# Patient Record
Sex: FEMALE | Race: WHITE | NOT HISPANIC OR LATINO | Employment: OTHER | URBAN - METROPOLITAN AREA
[De-identification: names, ages, dates, MRNs, and addresses within clinical notes are randomized per-mention and may not be internally consistent; named-entity substitution may affect disease eponyms.]

---

## 2021-01-27 ENCOUNTER — AMBULATORY - HEALTHEAST (OUTPATIENT)
Dept: NURSING | Facility: CLINIC | Age: 76
End: 2021-01-27

## 2021-02-17 ENCOUNTER — AMBULATORY - HEALTHEAST (OUTPATIENT)
Dept: NURSING | Facility: CLINIC | Age: 76
End: 2021-02-17

## 2021-06-16 PROBLEM — I48.91 ATRIAL FIBRILLATION WITH RVR (H): Status: ACTIVE | Noted: 2018-05-07

## 2021-06-16 PROBLEM — S36.039D LACERATION OF SPLEEN, SUBSEQUENT ENCOUNTER: Status: ACTIVE | Noted: 2018-05-03

## 2021-06-16 PROBLEM — J96.01 ACUTE RESPIRATORY FAILURE WITH HYPOXIA (H): Status: ACTIVE | Noted: 2018-05-07

## 2021-08-15 ENCOUNTER — HEALTH MAINTENANCE LETTER (OUTPATIENT)
Age: 76
End: 2021-08-15

## 2021-08-23 ENCOUNTER — OFFICE VISIT (OUTPATIENT)
Dept: NEUROLOGY | Facility: CLINIC | Age: 76
End: 2021-08-23
Payer: COMMERCIAL

## 2021-08-23 ENCOUNTER — LAB (OUTPATIENT)
Dept: LAB | Facility: CLINIC | Age: 76
End: 2021-08-23
Payer: COMMERCIAL

## 2021-08-23 VITALS — HEART RATE: 73 BPM | DIASTOLIC BLOOD PRESSURE: 87 MMHG | SYSTOLIC BLOOD PRESSURE: 182 MMHG

## 2021-08-23 DIAGNOSIS — G31.84 MILD COGNITIVE IMPAIRMENT WITH MEMORY LOSS: Primary | ICD-10-CM

## 2021-08-23 DIAGNOSIS — G31.84 MILD COGNITIVE IMPAIRMENT WITH MEMORY LOSS: ICD-10-CM

## 2021-08-23 LAB — VIT B12 SERPL-MCNC: 943 PG/ML (ref 213–816)

## 2021-08-23 PROCEDURE — 84425 ASSAY OF VITAMIN B-1: CPT

## 2021-08-23 PROCEDURE — 82607 VITAMIN B-12: CPT

## 2021-08-23 PROCEDURE — 99205 OFFICE O/P NEW HI 60 MIN: CPT | Performed by: PSYCHIATRY & NEUROLOGY

## 2021-08-23 PROCEDURE — 99417 PROLNG OP E/M EACH 15 MIN: CPT | Performed by: PSYCHIATRY & NEUROLOGY

## 2021-08-23 PROCEDURE — 36415 COLL VENOUS BLD VENIPUNCTURE: CPT

## 2021-08-23 RX ORDER — DONEPEZIL HYDROCHLORIDE 5 MG/1
TABLET, FILM COATED ORAL
Qty: 390 TABLET | Refills: 0 | Status: SHIPPED | OUTPATIENT
Start: 2021-08-23 | End: 2021-11-29

## 2021-08-23 NOTE — PATIENT INSTRUCTIONS
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  This disorder can come from multiple things, and it is unclear from your history and this visit alone what the cause is.  Certainly conditions like stroke, injury to the brain from lack of oxygen, vitamin/mineral deficits need to be ruled out as etiologies, considering these can either be corrected or do not progress.  - MRI brain w/wout contrast  - OT for CPT  - Neuropsychological testing  - B12, B1    One possible etiology for MCI amnestic type is dementia, specifically Alzheimer's.  To help slow progression for possible progression from MCI into Alzheimer's dementia, you would benefit from starting a medication called donepezil.  - Donepezil 5 mg at bedtime for 30 days, then   - Donepezil 10 mg at bedtime

## 2021-08-23 NOTE — LETTER
8/23/2021         RE: Terrie Dia  [address] [place] [address]        Dear Colleague,    Thank you for referring your patient, Terrie Dia, to the [facility]. Please see a copy of my visit note below.    [facility] Neurology Consultation    Terrie Dia MRN# [id number]   Age: 76 year old YOB: 1945     Requesting physician: Concepción Iraheta     Reason for Consultation: memory concerns      History of Presenting Symptoms:   Terrie Dia is a 76 year old female who presents today for evaluation of memory concerns.      The patient is here with her family ( and daughter).    She goes to bed around 1030 and wakes up around 6-7am.  She wakes without an alarm.  She walks the dog, makes her breakfast.  She dresses herself, manages her daily cares.  She manages her own medications.  She enjoys working the yard, cleans the house, goes to grocery store and buys her own groceries (drives there).  There is no report of getting lost. She doesn't feel she has lost any functionality or abilities.  She still pays the bills, and manages her checkbook.  She puts the taxes together but takes it to a .  She has not gotten scammed by e-mail or telephone.  She has had no visual hallucinations. She doesn't act out her dreams.  She doesn't have any issues with being socially inappropriate.  There are no guns in the house.    The patient's  indicates that recently (in the last year) his wife doesn't sleep well. She goes to bed and wakes up and moves to the sofa.  His wife might be more upset, easier to aggression than previous.  In the last few months, th patient has repeated herself frequently (says something then repeats it a few minutes later). He describes an event of her describing a thing their dog did, then a few minutes later saying the same thing again.  He has noted that when his wife is driving and is presented with detours from  construction, this can cause a great deal of difficulty for her.      The patient's daughter (1 of 4) is here today.  She describes that their grandmother had dementia.  The children have noted that their mother is repeating herself sometimes, and is having periods of confusion.  The patient has called her daughter and had a long conversation, then may call again a few minutes later and have the same conversation.  This type of repetition has been noted by the patient's children, and brothers/sisters.  Grandchildren are still allowed to ride with the patient.      Past Medical History:   Afib with RVR  2018 admission - Subcapsular splenic hematoma w/extravasation and shock requiring 6 units RBC, 2 units plts, and IR angiography with emobolization and ICU admission.     Medications:     Current Outpatient Medications   Medication Sig     levothyroxine (SYNTHROID, LEVOTHROID) 112 MCG tablet [LEVOTHYROXINE (SYNTHROID, LEVOTHROID) 112 MCG TABLET] Take 112 mcg by mouth Daily at 6:00 am.      metoprolol tartrate (LOPRESSOR) 25 MG tablet [METOPROLOL TARTRATE (LOPRESSOR) 25 MG TABLET] Take 1 tablet (25 mg total) by mouth 2 (two) times a day.      Physical Exam:   Vitals: BP (!) 182/87 (BP Location: Right arm, Patient Position: Sitting, Cuff Size: Adult Regular)   Pulse 73    General: Seated comfortably in no acute distress.  HEENT: Neck with normal range of motion.    Skin: No rashes  Neurologic:     Mental Status: Fully alert, attentive and oriented. Speech clear and fluent, no paraphasic errors.   MoCA: 19/30  Visualspatial: 3/5 - errors in copying cube, and hand placement on clock was wrong.  Naming: 3/3  Memory: 0/5 - Recalls Red, Am[employer], Face with category clue.  Recalls velvet with multiple choice. Doesn't recall benigno.  Attention: 1/2 - states digit list in same order instead of reverse order. 1/1 with letter list disinhibition.  1/3 with serial 7 (gave up after on subtraction).  Language: 2/2 with repetition. 1/1  with fluency (13 words, no repeats)  Abstraction: 2/2  Orientation: 5/6 (forgot date)     Cranial Nerves: Visual fields intact to threat. PERRL. EOMI with normal smooth pursuit. Hearing not formally tested but intact to conversation.     Motor: No tremors or other abnormal movements observed.      Gait: Normal, steady casual gait.        Data: Pertinent prior to visit   Imaging: None of brain    Laboratory:  TSH, BMP, CBC normal on 4/9/2019, and 5/9/2018 respectively.         Assessment and Plan:   Assessment:  Mild cognitive impairment amnestic type    The patient has a clinical history (repeating one-self, asking questions over and again) which has been worsening for a year or two in the setting of prior hospitalization in 2018 for shock related to blood loss.  It is unclear if all the patient's symptoms came on after this prior hospitalization, but there does seem to be progression in symptoms since.  Further imaging with MRI should aide in defining any injury that was present in 2018, or any patterns of atrophy that have come about since.  The patient has some minor insight into her deficits, and I do think she would benefit from both neuropsychological testing and from CPT through OT.  Given her MCI amnestic type diagnosis, we did discuss the use of donepezil and side-effects associated.  The patient and her family were receptive to the idea that other injury to the brain needed to be investigated with these tests, as well as repeated testing for cognitive impairment was needed before a diagnosis for a specific type of dementia could be given.     Plan:  B1, B12  MRI brain w/wout contrast  OT for CPT  Neuropsychological testing  Donepezil 5 mg at bedtime for 30 days, then 10 mg QHS    Follow up in Neurology clinic in 5-6 months (after neuropsychological testing, video) or should new concerns arise.    PATRICK Samuel D.O.   of Neurology    Total time  today (108 min) in this patient  encounter was spent on pre-charting, counseling and/or coordination of care. We reviewed diagnostic results, impressions, and discussed other possible tests if symptoms do not improve. We discussed the implications of the diagnosis, as well as risks and benefits of management options. We reviewed treatment instructions and our scheduled follow-up as specified in the discharge plan. We also discussed the importance of compliance with the chosen course of treatment. The patient is in agreement with this plan and has no further questions.        Again, thank you for allowing me to participate in the care of your patient.        Sincerely,        Cedric Samuel, DO

## 2021-08-23 NOTE — PROGRESS NOTES
[facility] Neurology Consultation    Terrie Dia MRN# [id number]   Age: 76 year old YOB: 1945     Requesting physician: Concepción Iraheta     Reason for Consultation: memory concerns      History of Presenting Symptoms:   Terrie Dia is a 76 year old female who presents today for evaluation of memory concerns.      The patient is here with her family ( and daughter).    She goes to bed around 1030 and wakes up around 6-7am.  She wakes without an alarm.  She walks the dog, makes her breakfast.  She dresses herself, manages her daily cares.  She manages her own medications.  She enjoys working the yard, cleans the house, goes to grocery store and buys her own groceries (drives there).  There is no report of getting lost. She doesn't feel she has lost any functionality or abilities.  She still pays the bills, and manages her checkbook.  She puts the taxes together but takes it to a .  She has not gotten scammed by e-mail or telephone.  She has had no visual hallucinations. She doesn't act out her dreams.  She doesn't have any issues with being socially inappropriate.  There are no guns in the house.    The patient's  indicates that recently (in the last year) his wife doesn't sleep well. She goes to bed and wakes up and moves to the sofa.  His wife might be more upset, easier to aggression than previous.  In the last few months, th patient has repeated herself frequently (says something then repeats it a few minutes later). He describes an event of her describing a thing their dog did, then a few minutes later saying the same thing again.  He has noted that when his wife is driving and is presented with detours from construction, this can cause a great deal of difficulty for her.      The patient's daughter (1 of 4) is here today.  She describes that their grandmother had dementia.  The children have noted that their mother is repeating herself sometimes, and is having periods of  confusion.  The patient has called her daughter and had a long conversation, then may call again a few minutes later and have the same conversation.  This type of repetition has been noted by the patient's children, and brothers/sisters.  Grandchildren are still allowed to ride with the patient.      Past Medical History:   Afib with RVR  2018 admission - Subcapsular splenic hematoma w/extravasation and shock requiring 6 units RBC, 2 units plts, and IR angiography with emobolization and ICU admission.     Medications:     Current Outpatient Medications   Medication Sig     levothyroxine (SYNTHROID, LEVOTHROID) 112 MCG tablet [LEVOTHYROXINE (SYNTHROID, LEVOTHROID) 112 MCG TABLET] Take 112 mcg by mouth Daily at 6:00 am.      metoprolol tartrate (LOPRESSOR) 25 MG tablet [METOPROLOL TARTRATE (LOPRESSOR) 25 MG TABLET] Take 1 tablet (25 mg total) by mouth 2 (two) times a day.      Physical Exam:   Vitals: BP (!) 182/87 (BP Location: Right arm, Patient Position: Sitting, Cuff Size: Adult Regular)   Pulse 73    General: Seated comfortably in no acute distress.  HEENT: Neck with normal range of motion.    Skin: No rashes  Neurologic:     Mental Status: Fully alert, attentive and oriented. Speech clear and fluent, no paraphasic errors.   MoCA: 19/30  Visualspatial: 3/5 - errors in copying cube, and hand placement on clock was wrong.  Naming: 3/3  Memory: 0/5 - Recalls Red, Mormonism, Face with category clue.  Recalls velvet with multiple choice. Doesn't recall benigno.  Attention: 1/2 - states digit list in same order instead of reverse order. 1/1 with letter list disinhibition.  1/3 with serial 7 (gave up after on subtraction).  Language: 2/2 with repetition. 1/1 with fluency (13 words, no repeats)  Abstraction: 2/2  Orientation: 5/6 (forgot date)     Cranial Nerves: Visual fields intact to threat. PERRL. EOMI with normal smooth pursuit. Hearing not formally tested but intact to conversation.     Motor: No tremors or other  abnormal movements observed.      Gait: Normal, steady casual gait.        Data: Pertinent prior to visit   Imaging: None of brain    Laboratory:  TSH, BMP, CBC normal on 4/9/2019, and 5/9/2018 respectively.         Assessment and Plan:   Assessment:  Mild cognitive impairment amnestic type    The patient has a clinical history (repeating one-self, asking questions over and again) which has been worsening for a year or two in the setting of prior hospitalization in 2018 for shock related to blood loss.  It is unclear if all the patient's symptoms came on after this prior hospitalization, but there does seem to be progression in symptoms since.  Further imaging with MRI should aide in defining any injury that was present in 2018, or any patterns of atrophy that have come about since.  The patient has some minor insight into her deficits, and I do think she would benefit from both neuropsychological testing and from CPT through OT.  Given her MCI amnestic type diagnosis, we did discuss the use of donepezil and side-effects associated.  The patient and her family were receptive to the idea that other injury to the brain needed to be investigated with these tests, as well as repeated testing for cognitive impairment was needed before a diagnosis for a specific type of dementia could be given.     Plan:  B1, B12  MRI brain w/wout contrast  OT for CPT  Neuropsychological testing  Donepezil 5 mg at bedtime for 30 days, then 10 mg QHS    Follow up in Neurology clinic in 5-6 months (after neuropsychological testing, video) or should new concerns arise.    PATRICK Samuel D.O.   of Neurology    Total time  today (108 min) in this patient encounter was spent on pre-charting, counseling and/or coordination of care. We reviewed diagnostic results, impressions, and discussed other possible tests if symptoms do not improve. We discussed the implications of the diagnosis, as well as risks and benefits of  management options. We reviewed treatment instructions and our scheduled follow-up as specified in the discharge plan. We also discussed the importance of compliance with the chosen course of treatment. The patient is in agreement with this plan and has no further questions.

## 2021-08-26 ENCOUNTER — HOSPITAL ENCOUNTER (OUTPATIENT)
Dept: MRI IMAGING | Facility: CLINIC | Age: 76
Discharge: HOME | End: 2021-08-26
Attending: PSYCHIATRY & NEUROLOGY | Admitting: PSYCHIATRY & NEUROLOGY
Payer: COMMERCIAL

## 2021-08-26 DIAGNOSIS — G31.84 MILD COGNITIVE IMPAIRMENT WITH MEMORY LOSS: ICD-10-CM

## 2021-08-26 PROCEDURE — 70551 MRI BRAIN STEM W/O DYE: CPT

## 2021-08-27 LAB — VIT B1 PYROPHOSHATE BLD-SCNC: 188 NMOL/L

## 2021-09-24 ENCOUNTER — HOSPITAL ENCOUNTER (OUTPATIENT)
Dept: OCCUPATIONAL THERAPY | Facility: CLINIC | Age: 76
End: 2021-09-24
Attending: PSYCHIATRY & NEUROLOGY
Payer: COMMERCIAL

## 2021-09-24 DIAGNOSIS — G31.84 MILD COGNITIVE IMPAIRMENT WITH MEMORY LOSS: ICD-10-CM

## 2021-09-24 PROCEDURE — 96125 COGNITIVE TEST BY HC PRO: CPT | Mod: GO | Performed by: OCCUPATIONAL THERAPIST

## 2021-09-24 PROCEDURE — 97535 SELF CARE MNGMENT TRAINING: CPT | Mod: GO | Performed by: OCCUPATIONAL THERAPIST

## 2021-09-24 NOTE — PROGRESS NOTES
[facility]    OUTPATIENT OCCUPATIONAL THERAPY  EVALUATION  PLAN OF TREATMENT FOR OUTPATIENT REHABILITATION  (COMPLETE FOR INITIAL CLAIMS ONLY)  Patient's Last Name, First Name, M.I.  YOB: 1945  Terrie Dia                        Provider's Name  [facility] Medical Record No.  [id number]                               Onset Date:     08/23/21   Start of Care Date:     09/24/21   Type:     ___PT   _X_OT   ___SLP Medical Diagnosis:     Mild cognitive impairment with memory loss                          OT Diagnosis:     Impaired memory Visits from SOC:  1   _________________________________________________________________________________  Plan of Treatment/Functional Goals:  Self care/Home management     See Daily Documentation in Flowsheets for details.      Goals  Goal Identifier: CPT  Goal Description: Pt will complete the Cognitive Performance Test and patient and family will verbalize understanding of the results and recommendations from the assessment to determine safety in home environment and with I/ADLs.  Target Date: 09/24/21  Date Met: 09/24/21    Goal Identifier: Memory  Goal Description: Patient will verbalize understanding of memory compensatory strategies and ways to stay cognitively fit to improve memory and independence for remembering appointments, conversations, etc.  Target Date: 09/24/21  Date Met: 09/24/21       Therapy Frequency: Eval only and One time Treat  Certification date from: 09/24/21, Certification date to: 09/24/21  Predicted Duration of Therapy Intervention (days/wks): One time treatment only    Thank you for referring Terrie to occupational therapy!   Laura Pizarro, OTR/L         I CERTIFY THE NEED FOR THESE SERVICES FURNISHED UNDER        THIS PLAN OF TREATMENT AND WHILE UNDER MY CARE     (Physician co-signature of this  document indicates review and certification of the therapy plan).                         Referring Physician: Cedric Samuel, DO     Initial Assessment        See Epic Evaluation      Start Of Care Date: 09/24/21

## 2021-09-24 NOTE — PROGRESS NOTES
Occupational Profile (including diagnosis and medical history): Terrie Dia is a 76-year-old female who presents today for occupational therapy evaluation secondary to mild cognitive impairment with memory loss to complete the cognitive performance test per MD orders. Pt attends evaluation alone. Per chart review, pt is reporting that she does not get lost often and doesn t feel as though she has lost any functional abilities or that she experiences any difficulties with her memory. Per report, the pt s mother was diagnosed with dementia. Per chart review, patient has called her daughter and had a long conversation and then will call again a few minutes later to have the same conversation. Per neurology note,  indicates that recently (in the last year) his wife doesn't sleep well and that in the last few months, the patient has repeated herself frequently. During the neurology appointment, her  described an event of her describing a thing their dog did, then a few minutes later saying the same thing again. He reported that when his wife is driving and is presented with detours from construction, this can cause a great deal of difficulty for her per neurology note.     The [place] Cognitive Assessment (MoCA) was designed as a rapid screening instrument for mild cognitive dysfunction. It assesses different cognitive domains: attention and concentration, executive functions, memory, language, visuoconstructional skills, conceptual thinking, calculations, and orientation. Completed the alternative version (Version 7.2) today (9/24/21) due to completing recent MoCA - Version 8.2. The total possible score is 30 points; a score of 26 or above is considered within normal range.   Patient scored 21/30.   Visualspatial: 4/5 - errors in copying cube.  Naming: 3/3  Memory: 0/5 - Recalls Truck, Green, Violin with category cues. Recalls Banana and Desk provided multiple choice cues.  Attention: 2/2 - states digit  list appropriately. 1/1 with letter list disinhibition. 2/3 with serial 7 (gave up after on subtraction).  Language: 2/2 with repetition. 1/1 with fluency (13 words, two repeats - saw and sharpen)  Abstraction: 2/2  Orientation: 4/6 - forgot date and day of the week     Previous cognitive screens completed in OT or by Physician and score: MoCA (Version 8.2) was completed by neurologist on 8/23/21.   Score: 19/30   Visualspatial: 3/5 - errors in copying cube, and hand placement on clock was wrong.  Naming: 3/3  Memory: 0/5 - Recalls Red, Roman [employer], Face with category clue. Recalls velvet with multiple choice. Doesn't recall benigno.  Attention: 1/2 - states digit list in same order instead of reverse order. 1/1 with letter list disinhibition. 1/3 with serial 7 (gave up after on subtraction).  Language: 2/2 with repetition. 1/1 with fluency (13 words, no repeats)  Abstraction: 2/2  Orientation: 5/6 (forgot date)    Functional History Interview:  Informants: Patient    Client s perception of memory/ thinking or functional difficulties: Patient reporting that she thinks that she might have started having more difficulty with memory following hospital stay due to perforated bowel. Patient reporting that she does not believe she has any difficulties with memory such as misplacing items, gets lost while driving, missing appointments, or missing paying bills. Reports making lists for all tasks such grocery, to-do lists during the day, weekly check ins - uses a calendar.      Living situation: Lives at home in a house with her , Rene Pires).      Home maintenance activities: Patient reporting that she mows the lawn, gardening, and cleans the house herself without concern.      Meal preparation and grocery shopping: Prepares 2-3 meals per day and reports using the stovetop (daily), oven (twice a week), and microwave (daily). Goes to the grocery and buys her own groceries.      Finances and paying bills: Patient pays all  bills currently and pulls tax information together with a  assisting with filing the taxes. Does not have automatic bill payments at this time. Reports that she does not want to automatically pay any bills. Manages her own check book and reports that she isn't as picky with balancing it as she has made no recent errors. Writes all bills and payments due per month in file cabinet that she routinely checks.      Medication management: Reports that she takes one medication and vitamins using weekly pill box for medication management.      Driving and transportation: Patient reports driving to all community outings and to run errands.  drives when they are going somewhere together typically.      Leisure and routine activities: Patient reports that enjoys tending to her garden, attending occasional lunch group (seasonal - quarterly), and Zoroastrian activities (cleaning, attending Zoroastrian).      ADL/Mobility/Physical Functioning: Reports no concerns with ADLs such as dressing, grooming, bathing. Manages all ADLs without AE required.     Fall Risk Screen:   Has the patient fallen 2 or more times in the last year? No      Has the patient fallen and had an injury in the past year? No    Is the patient a fall risk? No    Cognitive Performance Test    SUMMARY OF TEST:  The Cognitive Performance Test (CPT) is a standardized performance-based assessment to measure working memory/executive function processing capacities that underlie functional performance. Subtasks include common basic and instrumental activities of daily living (ADL/IADL) which are rated based on the manner in which patients respond to task demands of varying complexity. The total CPT score describes a level of functioning that indicates how information is processed, implications for functional activities, potential safety risks and a recommended level of supervision or assist based on cognitive function. The highest total score on this test  is in the range of 5.6 to 5.8.    DATE OF TESTIN21    RESULTS OF TESTING:                                                                                         CPT Subtest Results    MEDBOX: 5.5/6 SHOP/GLOVES:  PHONE: 4.56   WASH:   TRAVEL: N/A TOAST:    DRESS:    TOTAL CPT SCORE:  31/33     Average CPT Score  5.1/5.6    INTERPRETATION OF TEST RESULTS:    Based on the Cognitive Performance Test, this patient scored at CPT Level 5.0.  See CPT Levels reference below.    CPT Levels Reference:    Patient's Average CPT Score:  5.0                                                                                                                                                  Individual scores range along a continuum as outlined below.  In addition to cognitive status, other factors may affect safety in a home environment.  Please refer to specific recommendations for this patient.    ___5.6-5.8  Normal functioning (absence of cognitive-functional disability).  Independent in managing personal affairs, monitors and directs own behavior.  Uses complex information to carry out daily activities with safety and accuracy.    Proficient with instrumental activities of daily living (IADL) and learning new activity.  Problems are anticipated, errors are avoided, and consequences of actions are considered.      ___5.0   Mild cognitive-functional disability; deficits in working memory and executive thought processes. Difficulty using complex information. Problems may be observed with recent memory, judgment, reasoning and planning ahead. May be impulsive or have difficulty anticipating consequences.  Safety:  May require assistance to plan ahead; or to manage complex medication schedules, appointments or finances.  Hazardous activities may need to be monitored or limited.  ADL:  Mild functional decline.  Able to complete basic self-care and routine household tasks.  May have difficulty with complex daily tasks  "such as reading, writing, meal preparation, shopping or driving.   Learns through hands on teaching. Self-centered behavior or difficulty considering the needs of others may be seen related to trouble seeing the  whole picture\". Can appear disorganized or uninhibited.    ___4.5  Mild to moderate cognitive-functional disability. Significant deficits in working memory and executive thought processes. Judgment, reasoning and planning show obvious impairment.  Distractible with inability to shift attention/actions given competing stimuli.  Difficulty with problem solving and managing details. Complex daily tasks performed with inconsistency, difficulty, or error.     Safety:  Medications should be monitored, stove use may require supervision, and driving ability may be affected.  Impaired safety awareness with inability to anticipate potential problems.  May not recognize or respond to emergent situations. Requires frequent check-in support.   ADL:  Mild difficulty with simple everyday self-care tasks. Benefits from structured, routine activity.  Will likely need reminders to complete tasks outside of the routine. Requires assistance with planning and IADL tasks like shopping and finances. Learns concrete tasks through repetition, but performance may not generalize. Tends to be impulsive with poor insight. Self centered behavior or inability to consider the needs of others is common.    ___4.0  Moderate cognitive-functional disability; abstract to concrete thought processes. Working memory and executive function impairments are obvious. Difficulty with planning and problem solving.  Behavior is goal-directed, but unable to follow multi-step directions, is easily distracted, and may not recognize mistakes.  Inability to anticipate hazards or understand precautions.  Safety:  Recommend 24-hour supervision for safety. Supervision needed for medication management and for hazardous activities. May not be able to follow a " restricted diet. Can get lost in unfamiliar surroundings. Generally, persons functioning at level 4 should not be driving.   ADL:  Some decline in quality or frequency of ADL.  [place] enhanced by use of a routine, simple concrete directions, and caregiver set-up of needed items. Complex tasks such as money or home management typically requires assistance.  Relies heavily on vision to guide behavior; will ignore objects/hazards not in plain sight and can be distracted by irrelevant objects. Often has poor insight.  Able to carry out social conversation and may verbally  cover  for deficits leading caregivers to believe they are capable of functioning independently.       ___3.5  Moderate cognitive-functional disability; increased cues needed for task completion. Aware of concrete task steps but needs prompting or cues to initiate and complete simple tasks. Attention span is limited, simple directions may need to be repeated, and re-focus to a topic or task may be required.  Safety:  24-hour supervision required for safety and for assistance with daily tasks. Assistance required with medications, and access to medication should be limited. Meals, nutrition and dietary restrictions need to be monitored.  All hazardous activities should be restricted or supervised. Should not drive. Prone to wandering and can become lost.  ADL:  Moderate functional decline. Familiar tasks usually requires set-up of supplies and directions to complete steps. May need objects handed to them for task initiation. Function best with a set schedule in familiar surroundings with familiar people. All complex tasks must be done by others. Vocabulary is diminished and speech often unfocused.     Summary of functional cognitive status: During medication subtask, patient requiring general cueing for as needed medication and complex medication and able to self correct with cueing. During phone subtask, patient forgot which question to ask even  though she wrote the information down immediately with directions provided. Also demonstrated needing general cueing to locate paint retail store instead of contractors. Although patient reporting that she would have a contractor paint their house and she doesn't feel up to completing that large of a task aymore. Completed all other sub tasks without concern or deficits noted. Patient cooperative throughout all assessment tasks.     Factors affecting performance:  No additional problems noted    Recommendations:    Supervision in living setting:  Weekly checks                                                       TIME ADMINISTERING TEST: 35    TIME FOR INTERPRETATION AND PREPARATION OF REPORT: 25    TOTAL TIME: 60

## 2021-09-24 NOTE — PROGRESS NOTES
09/24/21 0800   Quick Adds   Quick Adds Certification       Present No   General Information   Start Of Care Date 09/24/21   Referring Physician Cedric Samuel, DO   Orders Other   Other Orders Cognitive Performance Testing  (Neuropsych referral was placed.)   Orders Date 08/23/21   Medical Diagnosis Mild cognitive impairment with memory loss   Onset of Illness/Injury or Date of Surgery 08/23/21   Precautions/Limitations No known precautions/limitations   Surgical/Medical History Reviewed Yes   Additional Occupational Profile Info/Pertinent History of Current Problem Terrie Dia is a 76-year-old female who presents today for occupational therapy evaluation secondary to mild cognitive impairment with memory loss to complete the cognitive performance test per MD orders. Pt attends evaluation with her . Per chart review, pt is reporting that she does not get lost often and doesn't feel as though she has lost any functional abilities or that she experiences any difficulties with her memory. Per report, the pt's mother was diagnosed with dementia.    Cognitive Status Examination   Orientation Orientation to person, place and time   Level of Consciousness Alert   Follows Commands and Answers Questions 100% of the time   Personal Safety and Judgment Intact   Memory Impaired   Attention No deficits were identified   Organization/Problem Solving No deficits were identified   Cognitive Comment See CPT note for details.    Visual Perception   Visual Perception No deficits were identified   Range of Motion (ROM)   ROM Quick Adds No deficits identified   Strength   Strength No deficits were identified   Functional Mobility   Ambulation IND   Bathing   Level of [place] - Bathing independent   Upper Body Dressing   Level of [place]: Dress Upper Body independent   Lower Body Dressing   Level of [place]: Dress Lower Body independent   Toileting   Level of [place]:  Toilet independent   Grooming   Level of [place]: Grooming independent   Eating/Self-Feeding   Level of [place]: Eating independent   Activity Tolerance   Activity Tolerance WNL   Planned Therapy Interventions   Planned Therapy Interventions Self care/Home management   Intervention Comments See Daily Documentation in Flowsheets for details.    Adult OT Eval Goals   OT Eval Goals (Adult) 1;2    OT Goal 1   Goal Identifier CPT   Goal Description Pt will complete the Cognitive Performance Test and patient and family will verbalize understanding of the results and recommendations from the assessment to determine safety in home environment and with I/ADLs.   Target Date 09/24/21   Date Met 09/24/21    OT Goal 2   Goal Identifier Memory   Goal Description Patient will verbalize understanding of memory compensatory strategies and ways to stay cognitively fit to improve memory and independence for remembering appointments, conversations, etc.   Target Date 09/24/21   Date Met 09/24/21   Clinical Impression   Criteria for Skilled Therapeutic Interventions Met Evaluation only   OT Diagnosis Impaired memory   Influenced by the following impairments mild cognitive impairment with memory loss    Assessment of Occupational Performance 1-3 Performance Deficits   Identified Performance Deficits Driving   Clinical Decision Making (Complexity) Low complexity   Therapy Frequency Eval only   Predicted Duration of Therapy Intervention (days/wks) One time treatment only   Risks and Benefits of Treatment have been explained. Yes   Patient, Family & other staff in agreement with plan of care Yes   Clinical Impression Comments Recommend one time treat only for education following cognitive performance test and initiation of cognitive HEP with exercises provided in session.   Education Assessment   Barriers To Learning Cognitive   Preferred Learning Style Reading;Demonstration   Therapy Certification   Certification date from  09/24/21   Certification date to 09/24/21

## 2021-10-26 ENCOUNTER — OFFICE VISIT (OUTPATIENT)
Dept: NEUROLOGY | Facility: CLINIC | Age: 76
End: 2021-10-26
Payer: COMMERCIAL

## 2021-10-26 DIAGNOSIS — F06.70 MILD NEUROCOGNITIVE DISORDER DUE TO ALZHEIMER'S DISEASE (H): Primary | ICD-10-CM

## 2021-10-26 DIAGNOSIS — G30.9 MILD NEUROCOGNITIVE DISORDER DUE TO ALZHEIMER'S DISEASE (H): Primary | ICD-10-CM

## 2021-10-26 NOTE — LETTER
10/26/2021         RE: Terrie Dia  [address]  [place] [address]        Dear Colleague,    Thank you for referring your patient, Terrie Dia, to the [facility]. Please see a copy of my visit note below.    NEUROPSYCHOLOGY EVALUATION  [facility]      NAME: Terrie Dia    YOB: 1945   AGE: 76 year old  EDU: 15  DATE OF EVALUATION: 10/26/2021    REASON FOR REFERRAL:  Ms. Dia is a 76 year old, right-handed,  female with hypothyroidism, atrial fibrillation with RVR, history of splenic laceration that resulted in traumatic hemorrhagic shock and severe blood loss, and childhood history of polio. Due to her family's concerns about progressive memory loss, she was referred for this neuropsychological evaluation by neurologist, Cedric Samuel DO, in order to assist with differential diagnosis and care planning.     SUMMARY OF FINDINGS: (please refer to Extended Report below for full details and comprehensive clinical history)  Due to the ongoing COVID-19 pandemic, this assessment was conducted using PPE worn by the examiner and a face-mask for the patient. The standard administration of these tests involves direct face-to-face methods. The full impact of applying non-standard administration methods with PPE is not fully appreciated at this time. As such, the diagnostic conclusions and recommendations for treatment provided in this report are being advanced with caution.    With these limitations in mind, results of testing indicate that Ms. Dia is of estimated average premorbid intellectual functioning, and most of Ms. Dia's performances are generally commensurate with that estimate. However, she exhibits impairment on tests of learning and memory, along with a relative weakness in semantic fluency. Of note, her semantic fluency is significantly lower than her phonemic fluency, with scores falling in the lower  limit of the low average range and the above average range, respectively. 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     With regard to learning/memory performances more specifically, Ms. Dia's verbal learning efficiency is mildly impaired, and her delayed free recall of that same information is exceptionally low (retention rates ranging from 6% to 20% after a 20-minute delay). Her learning of nonverbal (visual) information is intact and stronger than her verbal learning; however, she does not recall any information after 20 minutes on the nonverbal memory task. In general, her recall benefits slightly from prompts/cues on recognition testing on all memory tasks, but not as much as I would expect. This pattern of performance on memory testing appears most consistent with a mild encoding deficit.    All other cognitive domains are within normal limits, including attention/concentration, processing speed, visuospatial/constructional ability, all other language processing abilities (including comprehension, confrontation naming, phonemic fluency, and word reading), and all other executive functions (including abstract reasoning and mental flexibility/set-shifting). Aside from her low average semantic fluency, her verbal abilities appear to remain a relative strength of hers. Ms. Dia also denies any significant emotional distress at this time, both in the clinical interview and on self-report measures.    IMPRESSIONS:    Overall, these results are mostly suggestive of mild cerebral dysfunction in the bilateral hippocampal structures, along with more subtle compromise of the dominant lateral temporal region. Some very subtle involvement of the prefrontal structures cannot be fully ruled  out.    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 Along with her cognitive profile that reveals a mild encoding deficit and a significant discrepancy between semantic and verbal fluency (in favor of the latter), her memory loss began insidiously and has continued to progress over the past year. She also has a family history of dementia in her mother, who developed symptoms in her early 70's.     It is unlikely that the hemorrhagic shock and acute respiratory failure with hypoxia that occurred in 2018 is a primary causal factor at this point, particularly given that the patient's cognitive issues were not noticed until about a year ago. She also scored 5/5 on the Mini-Cog in 2019 (after those significant medical events), and most recently she scored 3/5 in May of this year (although this is a very crude cognitive screening measure). That said, those medical events may have made her brain more vulnerable to developing a neurodegenerative condition.    DIAGNOSIS:  Mild Neurocognitive Disorder, possibly due to Alzheimer's disease    RECOMMENDATIONS:  1) Ongoing neurologic care and monitoring is recommended.     2) Despite Ms. Dia's memory difficulties, she has been able to remain fully independent in all of her daily activities. This may be in part due to maintaining a routine and living in the same home/neighborhood for 50 years. She is encouraged to continue doing so; however, I would recommend occasional oversight with the more complex/important tasks. For example, she should continue managing her medications and filling her own pillbox, but someone could double-check he work for accuracy once a month.     3) If not already done, completion of paperwork for advance directives and assignment of  healthcare and financial Power of  should be considered at this time.    4) The Alzheimer s Association (http://www.alz.org/mnnd or 1-960.853.4478) has information about local support groups, respite services, other community resources, as well as a breadth of informational materials. Although she is not currently endorsing any significant emotional distress, if more individualized support is desired, the patient and/or her loved ones may benefit from establishing care with our psychologist, Adri Keenan PsyD, LP (at [facility] - [phone]). Dr. Keenan specializes in working with older adults with memory issues as well as their loved ones. If the patient is amenable, I will have our schedulers call her to set up an intake appointment.    5) The patient is encouraged to utilize cognitive compensatory strategies in daily life, including utilizing note pads, checklists, to-do lists, a calendar/planner, labeled alarm reminders, a GPS, a pillbox, and maintaining a daily morning and nighttime routine and an organized living environment.    6) Ms. Dia is encouraged to remain physically, socially, and mentally active in order to optimize her brain health.    7) Neuropsychological follow-up is recommended in 18-24 months (or as clinically indicated) in order to monitor her cognitive status, confirm suspected etiology, and update recommendations. The current test data can be used as a baseline to which future comparisons can be made.      FEEDBACK OF ASSESSMENT RESULTS:  Ms. Dia has requested to receive the results of this evaluation via a formal feedback appointment with me, which will be scheduled at the patient's convenience, typically within two weeks of today's date.     Thank you for allowing me to participate in Ms. Dia's care. Please contact me with any questions regarding the content of this report.        Marylin Batista PsyD, PAOLO  Licensed Clinical Neuropsychologist  [facility]  [facility] [address], [address]  Phone: [phone]          --------------------------------EXTENDED REPORT--------------------------------    HISTORY OF PRESENTING PROBLEM:  The following information was obtained via medical record review and by interview of the patient and her , Robb.    Per medical record review, the patient had her Annual Wellness Visit with her PCP (Concepción Iraheta MD) on 5/3/2021. As part of that visit, she was administered a brief cognitive screen (Mini-Cog) on which she scored 3/5, suggesting possible impairment. This score had declined from 5/5 when she was administered the task during an Annual Wellness Visit on 4/9/2019. Upon receiving feedback about the potential of having mild cognitive impairment, the patient acknowledged that she had been concerned about her memory and was agreeable to further work-up.    Ms. Dia was subsequently referred to neurologist, Cedric Samuel DO, with whom she met on 8/23/2021. During that appointment, her  and daughter reported that the patient had been repeating herself and seemed more confused over the past year. The patient was administered another brief cognitive screen (MOCA), on which she scored 19/30. Her neurologic examination was otherwise unremarkable. Dr. Samuel suspected that she has amnestic mild cognitive impairment, and referred her for further evaluation to help clarify etiology. Specifically, he ordered a brain MRI, which revealed mild to moderate chronic small vessel ischemic changes and mild generalized atrophy. He also ordered relevant blood work (including vitamins B12 and B1), which were normal. He also sent Ms. Dia for an occupational therapy evaluation to assess her IADLs, which took place on 9/24/2021. The MOCA was repeated (an alternate version), and she scored 21/30. Her score on the Cognitive Performance Test (CPT) was 5.0, with deficits noted in working memory  "and executive functions. Their evaluation suggested that the patient would benefit from weekly check-ins in her current living setting. Lastly, Dr. Samuel referred the patient for this neuropsychological evaluation.    Today, Ms. Dia acknowledged that she has been noticing mild issues with her memory, but felt that this evaluation was largely unnecessary. She noted that she has been through several evaluations of her memory as of late, which has been upsetting to her, as she has not noticed concerning changes. While she admitted that she repeats herself, she attributed this to her 's hearing loss or other external factors. She denied misplacing things more frequently, forgetting recent events, or any other cognitive changes. She continues to read novels without any issues following the story lines. In contrast to the patient's reports, Robb reported mostly noticing that his wife repeats herself frequently. He believes her memory issues began insidiously about a year ago and have gradually progressed over time. He stated that their four children have also expressed concern.    With regard to the activities of daily living, Ms. Dia reported that she is fully independent. She currently resides in a single-family home, where she and Robb have lived for over 50 years. They have no plans to move or downsize. She continues to drive without issue, although Robb noted that she has to refer to her map/directions more often before she leaves. She independently manages her medications with the use of a pillbox and routine. She also manages the bills and finances without issue. She continues to cook meals and perform household chores and errands independently. Robb corroborated those reports. She does not require reminders for personal hygiene. When asked if he would feel comfortable leaving his wife home alone for a weekend, Robb stated, \"Yes, but only because I know my children would check in on her " "often.\" Three of their four children live nearby (within 10 miles from the patient's home).    MEDICAL HISTORY:  Ms. Dia's medical history is significant for hypothyroidism, atrial fibrillation with RVR, and childhood history of polio when she was 5 or 6 years old. The only symptom she had with polio was an inability to walk, but this issue resolved over time. Ms. Dia also has history of subcapsular splenic hematoma after a colonoscopy that resulted in active extravasation, severe blood loss, acute respiratory failure with hypoxia, and traumatic hemorrhagic shock in May of 2018. Records note that she required 6 units of RBCs and 2 units of platelets. She underwent coil embolization of her splenic artery and was in the ICU/hospital for one week. The patient denied any history of significant head injuries, known seizure, stroke, heart attack, tremor, recent falls, balance issues, hallucinations and microsmia/anosmia. To her knowledge, she has never had COVID-19.    The patient reported that her sleep is normal and largely undisturbed. She estimates that she is typically getting about 8 hours of sleep per night and reported that she feels well rested in the morning. Interestingly, Robb reported that she will get out of bed and fall asleep on the couch downstairs a few nights per week. The patient stated that she was unaware that she was doing it that often, and then mentioned that occasionally her legs bother her so she moves to the couch. She noted that she feels adequately energized during the day. Symptoms of SANDRA or REM sleep behavior disorder were denied.    Past Surgical History:   Procedure Laterality Date     IR VISCERAL ANGIOGRAM  5/3/2018     IR VISCERAL ANGIOGRAM  5/3/2018     Diagnostic studies:  Brain MRI dated 8/26/2021 revealed mild to moderate chronic small vessel ischemic changes and mild generalized atrophy. Relevant blood work from 8/23/2021 (including vitamins B12 and B1) were normal.    Current " medications include (per medical record):   Current Outpatient Medications:      acetaminophen (TYLENOL) 325 MG tablet, [ACETAMINOPHEN (TYLENOL) 325 MG TABLET] Take 2 tablets (650 mg total) by mouth every 4 (four) hours as needed., Disp: , Rfl: 0     bacitracin 500 unit/gram ointment, [BACITRACIN 500 UNIT/GRAM OINTMENT] Apply topically 3 (three) times a day. (Patient not taking: Reported on 2021), Disp: , Rfl: 0     donepezil (ARICEPT) 5 MG tablet, Take 1 tablet (5 mg) by mouth At Bedtime for 30 days, THEN 2 tablets (10 mg) At Bedtime., Disp: 390 tablet, Rfl: 0     glucosamine-chondroitin 500-400 mg cap, [GLUCOSAMINE-CHONDROITIN 500-400 MG CAP] Take 1 capsule by mouth daily., Disp: , Rfl:      HYDROcodone-acetaminophen 5-325 mg per tablet, [HYDROCODONE-ACETAMINOPHEN 5-325 MG PER TABLET] Take 1-2 tablets by mouth every 4 (four) hours as needed. (Patient not taking: Reported on 2021), Disp: 10 tablet, Rfl: 0     levothyroxine (SYNTHROID, LEVOTHROID) 112 MCG tablet, [LEVOTHYROXINE (SYNTHROID, LEVOTHROID) 112 MCG TABLET] Take 112 mcg by mouth Daily at 6:00 am. , Disp: , Rfl:      metoprolol tartrate (LOPRESSOR) 25 MG tablet, [METOPROLOL TARTRATE (LOPRESSOR) 25 MG TABLET] Take 1 tablet (25 mg total) by mouth 2 (two) times a day. (Patient not taking: Reported on 2021), Disp: 60 tablet, Rfl: 0     vitamin B complex (B COMPLEX ORAL), [VITAMIN B COMPLEX (B COMPLEX ORAL)] Take 1 tablet by mouth daily. B complex with vitamin D (Patient not taking: Reported on 2021), Disp: , Rfl:     RELEVANT FAMILY MEDICAL HISTORY:   Family neurologic history is significant for dementia in her mother, who began having memory issues in her early 70's and was placed in an assisted living facility. She passed away shortly thereafter (around 72 or 73) due to complications related to dementia. The patient's father  of a stroke at age 60. There is no other known family neurologic history. Other family medical history is  "positive for the following:  Family History   Problem Relation Age of Onset     Heart Disease Mother      Cerebrovascular Disease Father      PSYCHIATRIC HISTORY:  With regard to her psychiatric history, Ms. Dia denied a history of depression, anxiety, or any other mental health condition or treatment. Currently, the patient described her mood as \"pretty good\" and denied any significant symptoms of depression or anxiety. Robb noted that she is slightly more irritable than she used to be, but otherwise denied any notable personality changes or concerns about her mood. Ms. Dia acknowledged feeling upset by the ongoing evaluation of her memory loss, to which Robb noted, \"She is scared.\" Other stressors were denied.    With regard to substance abuse, Ms. Dia denied history of past drug or alcohol abuse or dependence. Currently, she reported that she typically consumes one glass of wine per week. Other current substance use was denied.    SOCIAL HISTORY:  Ms. Dia was born and raised in the small town [place], [place]. [place] was [place]. Complications with her birth were denied, as were any developmental delays. She graduated high school and went on to complete a 3-year nursing degree at [employer]. She earned mostly A's and B's for grades throughout her schooling. Significant learning difficulties or developmental attention issues were denied. She worked as a nurse in the  ICU for 50 years, and retired of her own accord about 10 years ago. She has been  for 50 years, and they have 4 children together and 9 grandchildren. The patient and her  live together in their own home in [place], [place]. For leisure, she enjoys reading, gardening, embroidery, and is fairly active in her [employer] where she does quite a bit of volunteer work. She also attends aerobic classes at her gym twice weekly.    TESTS ADMINISTERED:   Wechsler Memory Scale-III (WMS-III) select " subtests, Wechsler Adult Intelligence Scale-IV (WAIS-IV) select subtests, Wide Range Achievement Test-4 (WRAT-4) select subtests, Wechsler Memory Scale-IV (WMS-IV) select subtests, Carvajal Verbal Learning Test-R (HVLT-R), Trailmaking Test (Trails A & B), Controlled Oral Word Association Test (COWAT) and Category Fluency, [place] Naming Test-2 (BNT-2), Clock Drawing Test, Stroop Color and Word Test, [place] Card Sorting Test-1 deck (WCST-64), Generalized Anxiety Disorder-7 Assessment (ADONIS-7) and Geriatric Depression Scale-Short Form (GDS-15).    MOANS norms were used for BNT, Trail Making Test A & B, COWAT & Category Fluency    DESCRIPTIVE PERFORMANCE KEY:    Labels for tests with Normal Distributions  Score Label Standard Score %ile Rank   Exceptionally high score  > 130 > 98   Above average score 120-129 91-97   High average score 110-119 75-90   Average score  25-74   Low average score 80-89 9-24   Below average score 70-79 2-8   Exceptionally low score < 70 < 2     Labels for tests with Non-Normal Distributions  Score Label %ile Rank   Within normal expectations/ limits score (WNL) > 24   Low average score 9-24   Below average score 2-8   Exceptionally low score < 2     The following test results utilize score labels as adapted from Cameron Styles, Tj Cano, Damaris Stovall, GIRMA Ramirez, Rashi Contreras Michael Westerveld & Conference Participatnts (2020): American Academy of Clinical Neuropsychology consensus conference statement on uniform labeling of performance test scores, The Clinical Neuropsychologist, DOI: 10.1080/[email or web address]. All scores contain some measure of error; scores are reported here as they are obtained by the individual (without reference to the range of error). These are meant as labels and not interpretation of performance. While other relevant comments regarding task performance are provided below, please see the Summary,  Impressions, and Diagnosis sections of this report for interpretation of the scores and the cognitive profile as a whole, including what does and does not constitute impairment for this particular individual.    COVID-19-ERA NEUROPSYCHOLOGY LIMITATIONS:  Due to the ongoing COVID-19 pandemic, this assessment was conducted with the examiner wearing [employer]-designated PPE and the patient wearing a face mask. The standard administration of these procedures involves direct face-to-face methods. The impact of applying non-standard administration methods has been evaluated only in part by scientific research. While every effort was made to simulate standard assessment practices, the diagnostic conclusions and recommendations for treatment provided in this report are being advanced with these limitations in mind.    BEHAVIORAL OBSERVATIONS:   Ms. Dia arrived on time and accompanied by her  to today's appointment. She was appropriately dressed and groomed. Gait and other motor activity appeared normal. She appeared alert and engaged, although mildly anxious and guarded about this evaluation. No vision or hearing difficulties were observed. Conversational speech was of normal rate, volume, and prosody. No word-finding pauses or paraphasias were noted. Her thought process appeared linear and goal-directed. No hallucinations or delusions were apparent. Insight appeared limited. Her mood was generally euthymic and her affect was appropriately reactive. Rapport was easily established and eye contact was appropriate.     During the testing session, Ms. Dia was alert throughout. She continued to be apprehensive about the testing and appeared mildly anxious, but was cooperative throughout the evaluation. She understood test instructions without difficulty. No frontal signs were observed behaviorally. Response latencies were within normal limits. Ms. Dia appeared adequately motivated and engaged easily during  testing. Overall, the following results are considered a reasonably valid estimation of her current cognitive abilities.    OPTIMAL PREMORBID INTELLECT:  Optimal premorbid intellectual abilities were estimated as falling in the average range based on Ms. Dia's educational and occupational histories and performance on tasks least likely to be affected by acquired brain dysfunction.    SUMMARY OF TEST RESULTS:  ORIENTATION. Performance on a mental status exam, assessing orientation to personal and current information, resulted in a below average score. She was oriented to personal information, place, the current year, and was able to correctly name the current [place] president. She looked at her phone when she was asked to name the day of the month and the day of the week. She stated that the current month was September (instead of October) and underestimated the current time by 67 minutes. She also was unable to recall the name of the previous [place] president.    ATTENTION/WORKING MEMORY. The patient's score on a measure sensitive to sustained auditory-verbal attention and concentration (WAIS-IV Digit Span) was classified as average, as she was able to recite up to 6 digits forward (an average score), up to 3 digits backward (a low average score), and up to 4 digits in sequence (an average score).      PROCESSING SPEED. Speeded digit-symbol coding was measured as an average score (WAIS-IV Coding). On a test of complex concentration that requires speeded numeric sequencing (Trails A), the patient's score was above average for completion time and without error. On the Stroop test, speeded color naming was average, as was speeded word reading.     LANGUAGE PROCESSING. Language comprehension appeared intact. Her score on a measure of verbal abstract reasoning was average (WAIS-IV Similarities). Confrontation naming was measured as a within normal limits score (59/60; BNT-2). The patient's performance on a test of phonemic  fluency resulted in an above average score (COWAT), while her semantic fluency was low average with 3 set-loss errors and 1 repetition error (Category Fluency). Her writing sample was intact and there was no evidence of micrographia.     VISUOSPATIAL/CONSTRUCTIONAL SKILLS. The WAIS-IV Perceptual Reasoning Index was measured as an average score (pro-rated CHEMO = 98), with average nonverbal abstract reasoning (Matrix Reasoning), and average visuoconstruction with three-dimensional blocks (Block Design). On a Clock Drawing Task, the patient was able to draw a large, well-formed [place] with equally spaced and correctly placed numbers. Her clock hands converged nicely in the center of the clock face and were differentiated by length.      LEARNING/MEMORY. On the WMS-IV Logical Memory subtest, immediate memory for two paragraph-length stories resulted in a below average score. After a 20-minute delay, the patient's score on the delayed recall trial was exceptionally low with a 6% retention rate. On the recognition trial, the patient's score was classified as average; however, she was observed to be guessing quite a bit and had a positive response bias.    On a 12-item verbal list-learning task (HVLT-R), the patient acquired up to 5 words (42%) of the word list by the 3rd and final learning trial (raw scores over trials = 5, 5, 5). Total learning acquisition was classified as a below average score. After a 20-minute delay, the patient recalled only 1 item (and made 2 intrusion errors), reflecting an exceptionally low score with a 20% retention rate. Her recognition discriminability score was below average, as she correctly recognized 11/12 items but also made 3 false-positive errors.     On a visual memory measure (WMS-IV Visual Reproduction), immediate recall of simple geometric figures was average, while delayed recall of the figures was exceptionally low with a 0% retention rate. Delayed recognition of the figures was  low average, as she was only able to correctly recognize 2 out of the 7 figures in a multiple-choice format.     EXECUTIVE FUNCTIONS. Concept identification and the ability to generate alternative problem solving strategies was within normal limits for age on the [place] Card Sorting Test. She completed 2 out of 3 categories (within normal limits) with a total of 28 errors, which is low average. Twenty of her errors were perseverative (low average) and there was 1 failure to maintain set. On a test of inhibition and cognitive flexibility (Stroop), the patient's score was low average and she made 2 errors. On a test that requires speeded alpha-numeric sequencing/cognitive set-shifting (Trails B), performance was average and without error. Verbal and nonverbal abstract reasoning were both average (WAIS-IV Similarities and Matrix Reasoning, respectively). Phonemic fluency was above average (COWAT). Performance on the Clock Drawing Test was impaired, as she was unable to accurately represent the specified analog time (she mixed up the hour and minute hands).    MOOD. On the GDS-15 a self report measure of depressive symptomatology, she obtained a score of 0, placing her in the range of normal depression. She denied suicidal ideation. On the ADONIS-7, a self-report measure of anxiety, she obtained a score of 0,  placing her in the range of minimal anxiety.    ____________________________________________________________________________________    SERVICES PROVIDED & TIME:  On-site Office Visit Details   A clinical interview/neurobehavioral status examination was conducted with the patient and documented. I thoroughly reviewed the medical record, selected the neuropsychological test battery, provided supervision to the trained examiner/technician, scored all of the neuropsychological tests (2+ tests), interpreted/integrated patient data and test results, and engaged in clinical decision making, treatment planning, report  writing/preparation and provision of interactive feedback of test results on 11/8/2021. A trained examiner/technician administered and scored the neuropsychological tests (2+ tests).  Please see below for a breakdown of time spent and the associated codes billed for these services.  Services   Time Spent  CPT Codes   Neurobehavioral Status Exam:  (e.g., clinical interview and neurobehavioral status exam, interpretation, report)   65 minutes   1 x 96116  0 x 96121   Neuropsychological Evaluation Services:   (e.g., integration, interpretation, treatment planning, clinical decision making, feedback via telehealth)   202 minutes   1 x 96132  2 x 96133   Neuropsychological Testing by Psychologist:  (e.g., test administration, scoring, 2+ tests administered)   51 minutes   1 x 96136  1 x 96137     Neuropsychological Testing by Trained Examiner/Technician:  (e.g., test administration, scoring, 2+ tests administered)   110 minutes   1 x 96138  3 x 96139   For diagnostic and coding purposes, Ms. Dia has a history of hypothyroidism, atrial fibrillation with RVR, history of splenic laceration that resulted in traumatic hemorrhagic shock and severe blood loss, and childhood history of polio. 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 Please note, all charges are filed at the completion of the Episode of Care and associated with the final encounter date (feedback session on 11/8/2021).         The patient was seen for a neuropsychological evaluation for the purposes of diagnostic clarification and treatment planning. 110 minutes of face-to-face testing were provided by this writer.The patient was cooperative with testing. No concerns were brought to my attention. Please see Dr. Batista's report for a detailed description of the charges and interpretation and integration of the findings.      Again, thank you for allowing me to participate in the care of your patient.        Sincerely,        Marylin SWEENEY  Therese Batista, LP

## 2021-10-26 NOTE — PROGRESS NOTES
NEUROPSYCHOLOGY EVALUATION  [facility]      NAME: Terrie Dia    YOB: 1945   AGE: 76 year old  EDU: 15  DATE OF EVALUATION: 10/26/2021    REASON FOR REFERRAL:  Ms. Dia is a 76 year old, right-handed,  female with hypothyroidism, atrial fibrillation with RVR, history of splenic laceration that resulted in traumatic hemorrhagic shock and severe blood loss, and childhood history of polio. Due to her family's concerns about progressive memory loss, she was referred for this neuropsychological evaluation by neurologist, Cedric Samuel DO, in order to assist with differential diagnosis and care planning.     SUMMARY OF FINDINGS: (please refer to Extended Report below for full details and comprehensive clinical history)  Due to the ongoing COVID-19 pandemic, this assessment was conducted using PPE worn by the examiner and a face-mask for the patient. The standard administration of these tests involves direct face-to-face methods. The full impact of applying non-standard administration methods with PPE is not fully appreciated at this time. As such, the diagnostic conclusions and recommendations for treatment provided in this report are being advanced with caution.    With these limitations in mind, results of testing indicate that Ms. Dia is of estimated average premorbid intellectual functioning, and most of Ms. Dia's performances are generally commensurate with that estimate. However, she exhibits impairment on tests of learning and memory, along with a relative weakness in semantic fluency. Of note, her semantic fluency is significantly lower than her phonemic fluency, with scores falling in the lower limit of the low average range and the above average range, respectively. Lastly, some aspects of executive functioning (novel problem solving/concept identification and cognitive inhibition) are in the low average range and may represent a change from her  premorbid functioning, particularly given her prior career as  intensive care nurse.     With regard to learning/memory performances more specifically, Ms. Dia's verbal learning efficiency is mildly impaired, and her delayed free recall of that same information is exceptionally low (retention rates ranging from 6% to 20% after a 20-minute delay). Her learning of nonverbal (visual) information is intact and stronger than her verbal learning; however, she does not recall any information after 20 minutes on the nonverbal memory task. In general, her recall benefits slightly from prompts/cues on recognition testing on all memory tasks, but not as much as I would expect. This pattern of performance on memory testing appears most consistent with a mild encoding deficit.    All other cognitive domains are within normal limits, including attention/concentration, processing speed, visuospatial/constructional ability, all other language processing abilities (including comprehension, confrontation naming, phonemic fluency, and word reading), and all other executive functions (including abstract reasoning and mental flexibility/set-shifting). Aside from her low average semantic fluency, her verbal abilities appear to remain a relative strength of hers. Ms. Dia also denies any significant emotional distress at this time, both in the clinical interview and on self-report measures.    IMPRESSIONS:    Overall, these results are mostly suggestive of mild cerebral dysfunction in the bilateral hippocampal structures, along with more subtle compromise of the dominant lateral temporal region. Some very subtle involvement of the prefrontal structures cannot be fully ruled out.    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 Along with her cognitive profile that reveals a mild encoding deficit and a significant discrepancy between semantic and verbal fluency (in favor of the latter), her memory loss began insidiously and has continued to progress over the past year. She also has a family history of dementia in her mother, who developed symptoms in her early 70's.     It is unlikely that the hemorrhagic shock and acute respiratory failure with hypoxia that occurred in 2018 is a primary causal factor at this point, particularly given that the patient's cognitive issues were not noticed until about a year ago. She also scored 5/5 on the Mini-Cog in 2019 (after those significant medical events), and most recently she scored 3/5 in May of this year (although this is a very crude cognitive screening measure). That said, those medical events may have made her brain more vulnerable to developing a neurodegenerative condition.    DIAGNOSIS:  Mild Neurocognitive Disorder, possibly due to Alzheimer's disease    RECOMMENDATIONS:  1) Ongoing neurologic care and monitoring is recommended.     2) Despite Ms. Dia's memory difficulties, she has been able to remain fully independent in all of her daily activities. This may be in part due to maintaining a routine and living in the same home/neighborhood for 50 years. She is encouraged to continue doing so; however, I would recommend occasional oversight with the more complex/important tasks. For example, she should continue managing her medications and filling her own pillbox, but someone could double-check he work for accuracy once a month.     3) If not already done, completion of paperwork for advance directives and assignment of healthcare and financial Power of  should be considered at this time.    4) The Alzheimer s Association (http://www.alz.org/mnnd or 1-629.535.6263) has information about local support groups, respite services, other community resources, as well as a breadth  of informational materials. Although she is not currently endorsing any significant emotional distress, if more individualized support is desired, the patient and/or her loved ones may benefit from establishing care with our psychologist, Adri Keenan PsyD, LP (at [facility] - [phone]). Dr. Keenan specializes in working with older adults with memory issues as well as their loved ones. If the patient is amenable, I will have our schedulers call her to set up an intake appointment.    5) The patient is encouraged to utilize cognitive compensatory strategies in daily life, including utilizing note pads, checklists, to-do lists, a calendar/planner, labeled alarm reminders, a GPS, a pillbox, and maintaining a daily morning and nighttime routine and an organized living environment.    6) Ms. Dia is encouraged to remain physically, socially, and mentally active in order to optimize her brain health.    7) Neuropsychological follow-up is recommended in 18-24 months (or as clinically indicated) in order to monitor her cognitive status, confirm suspected etiology, and update recommendations. The current test data can be used as a baseline to which future comparisons can be made.      FEEDBACK OF ASSESSMENT RESULTS:  Ms. Dia has requested to receive the results of this evaluation via a formal feedback appointment with me, which will be scheduled at the patient's convenience, typically within two weeks of today's date.     Thank you for allowing me to participate in Ms. Dia's care. Please contact me with any questions regarding the content of this report.        Marylin Batista PsyD, LP  Licensed Clinical Neuropsychologist  [facility] [address], [address]  Phone: [phone]          --------------------------------EXTENDED REPORT--------------------------------    HISTORY OF PRESENTING PROBLEM:  The following information was obtained via medical record  review and by interview of the patient and her , Robb.    Per medical record review, the patient had her Annual Wellness Visit with her PCP (Concepción Iraheta MD) on 5/3/2021. As part of that visit, she was administered a brief cognitive screen (Mini-Cog) on which she scored 3/5, suggesting possible impairment. This score had declined from 5/5 when she was administered the task during an Annual Wellness Visit on 4/9/2019. Upon receiving feedback about the potential of having mild cognitive impairment, the patient acknowledged that she had been concerned about her memory and was agreeable to further work-up.    Ms. Dia was subsequently referred to neurologist, Cedric Samuel DO, with whom she met on 8/23/2021. During that appointment, her  and daughter reported that the patient had been repeating herself and seemed more confused over the past year. The patient was administered another brief cognitive screen (MOCA), on which she scored 19/30. Her neurologic examination was otherwise unremarkable. Dr. Samuel suspected that she has amnestic mild cognitive impairment, and referred her for further evaluation to help clarify etiology. Specifically, he ordered a brain MRI, which revealed mild to moderate chronic small vessel ischemic changes and mild generalized atrophy. He also ordered relevant blood work (including vitamins B12 and B1), which were normal. He also sent Ms. Dia for an occupational therapy evaluation to assess her IADLs, which took place on 9/24/2021. The MOCA was repeated (an alternate version), and she scored 21/30. Her score on the Cognitive Performance Test (CPT) was 5.0, with deficits noted in working memory and executive functions. Their evaluation suggested that the patient would benefit from weekly check-ins in her current living setting. Lastly, Dr. Samuel referred the patient for this neuropsychological evaluation.    Today, Ms. Dia acknowledged that she has been  "noticing mild issues with her memory, but felt that this evaluation was largely unnecessary. She noted that she has been through several evaluations of her memory as of late, which has been upsetting to her, as she has not noticed concerning changes. While she admitted that she repeats herself, she attributed this to her 's hearing loss or other external factors. She denied misplacing things more frequently, forgetting recent events, or any other cognitive changes. She continues to read novels without any issues following the story lines. In contrast to the patient's reports, Robb reported mostly noticing that his wife repeats herself frequently. He believes her memory issues began insidiously about a year ago and have gradually progressed over time. He stated that their four children have also expressed concern.    With regard to the activities of daily living, Ms. Dia reported that she is fully independent. She currently resides in a single-family home, where she and oRbb have lived for over 50 years. They have no plans to move or downsize. She continues to drive without issue, although Robb noted that she has to refer to her map/directions more often before she leaves. She independently manages her medications with the use of a pillbox and routine. She also manages the bills and finances without issue. She continues to cook meals and perform household chores and errands independently. Robb corroborated those reports. She does not require reminders for personal hygiene. When asked if he would feel comfortable leaving his wife home alone for a weekend, Robb stated, \"Yes, but only because I know my children would check in on her often.\" Three of their four children live nearby (within 10 miles from the patient's home).    MEDICAL HISTORY:  Ms. Dia's medical history is significant for hypothyroidism, atrial fibrillation with RVR, and childhood history of polio when she was 5 or 6 years old. The only " symptom she had with polio was an inability to walk, but this issue resolved over time. Ms. Dia also has history of subcapsular splenic hematoma after a colonoscopy that resulted in active extravasation, severe blood loss, acute respiratory failure with hypoxia, and traumatic hemorrhagic shock in May of 2018. Records note that she required 6 units of RBCs and 2 units of platelets. She underwent coil embolization of her splenic artery and was in the ICU/hospital for one week. The patient denied any history of significant head injuries, known seizure, stroke, heart attack, tremor, recent falls, balance issues, hallucinations and microsmia/anosmia. To her knowledge, she has never had COVID-19.    The patient reported that her sleep is normal and largely undisturbed. She estimates that she is typically getting about 8 hours of sleep per night and reported that she feels well rested in the morning. Interestingly, Robb reported that she will get out of bed and fall asleep on the couch downstairs a few nights per week. The patient stated that she was unaware that she was doing it that often, and then mentioned that occasionally her legs bother her so she moves to the couch. She noted that she feels adequately energized during the day. Symptoms of SANDRA or REM sleep behavior disorder were denied.    Past Surgical History:   Procedure Laterality Date     IR VISCERAL ANGIOGRAM  5/3/2018     IR VISCERAL ANGIOGRAM  5/3/2018     Diagnostic studies:  Brain MRI dated 8/26/2021 revealed mild to moderate chronic small vessel ischemic changes and mild generalized atrophy. Relevant blood work from 8/23/2021 (including vitamins B12 and B1) were normal.    Current medications include (per medical record):   Current Outpatient Medications:      acetaminophen (TYLENOL) 325 MG tablet, [ACETAMINOPHEN (TYLENOL) 325 MG TABLET] Take 2 tablets (650 mg total) by mouth every 4 (four) hours as needed., Disp: , Rfl: 0     bacitracin 500 unit/gram  ointment, [BACITRACIN 500 UNIT/GRAM OINTMENT] Apply topically 3 (three) times a day. (Patient not taking: Reported on 2021), Disp: , Rfl: 0     donepezil (ARICEPT) 5 MG tablet, Take 1 tablet (5 mg) by mouth At Bedtime for 30 days, THEN 2 tablets (10 mg) At Bedtime., Disp: 390 tablet, Rfl: 0     glucosamine-chondroitin 500-400 mg cap, [GLUCOSAMINE-CHONDROITIN 500-400 MG CAP] Take 1 capsule by mouth daily., Disp: , Rfl:      HYDROcodone-acetaminophen 5-325 mg per tablet, [HYDROCODONE-ACETAMINOPHEN 5-325 MG PER TABLET] Take 1-2 tablets by mouth every 4 (four) hours as needed. (Patient not taking: Reported on 2021), Disp: 10 tablet, Rfl: 0     levothyroxine (SYNTHROID, LEVOTHROID) 112 MCG tablet, [LEVOTHYROXINE (SYNTHROID, LEVOTHROID) 112 MCG TABLET] Take 112 mcg by mouth Daily at 6:00 am. , Disp: , Rfl:      metoprolol tartrate (LOPRESSOR) 25 MG tablet, [METOPROLOL TARTRATE (LOPRESSOR) 25 MG TABLET] Take 1 tablet (25 mg total) by mouth 2 (two) times a day. (Patient not taking: Reported on 2021), Disp: 60 tablet, Rfl: 0     vitamin B complex (B COMPLEX ORAL), [VITAMIN B COMPLEX (B COMPLEX ORAL)] Take 1 tablet by mouth daily. B complex with vitamin D (Patient not taking: Reported on 2021), Disp: , Rfl:     RELEVANT FAMILY MEDICAL HISTORY:   Family neurologic history is significant for dementia in her mother, who began having memory issues in her early 70's and was placed in an assisted living facility. She passed away shortly thereafter (around 72 or 73) due to complications related to dementia. The patient's father  of a stroke at age 60. There is no other known family neurologic history. Other family medical history is positive for the following:  Family History   Problem Relation Age of Onset     Heart Disease Mother      Cerebrovascular Disease Father      PSYCHIATRIC HISTORY:  With regard to her psychiatric history, Ms. Dia denied a history of depression, anxiety, or any other mental health  "condition or treatment. Currently, the patient described her mood as \"pretty good\" and denied any significant symptoms of depression or anxiety. Robb noted that she is slightly more irritable than she used to be, but otherwise denied any notable personality changes or concerns about her mood. Ms. Dia acknowledged feeling upset by the ongoing evaluation of her memory loss, to which Robb noted, \"She is scared.\" Other stressors were denied.    With regard to substance abuse, Ms. Dia denied history of past drug or alcohol abuse or dependence. Currently, she reported that she typically consumes one glass of wine per week. Other current substance use was denied.    SOCIAL HISTORY:  Ms. Dia was born and raised in the small town of [place], [place]. She was [place]. Complications with her birth were denied, as were any developmental delays. She graduated high school and went on to complete a 3-year nursing degree at [employer]. She earned mostly A's and B's for grades throughout her schooling. Significant learning difficulties or developmental attention issues were denied. She worked as a nurse in the  ICU for 50 years, and retired of her own accord about 10 years ago. She has been  for 50 years, and they have 4 children together and 9 grandchildren. The patient and her  live together in their own home in [place], [place]. For leisure, she enjoys reading, gardening, embroidery, and is fairly active in her [employer] where she does quite a bit of volunteer work. She also attends aerobic classes at her gym twice weekly.    TESTS ADMINISTERED:   Wechsler Memory Scale-III (WMS-III) select subtests, Wechsler Adult Intelligence Scale-IV (WAIS-IV) select subtests, Wide Range Achievement Test-4 (WRAT-4) select subtests, Wechsler Memory Scale-IV (WMS-IV) select subtests, Carvajal Verbal Learning Test-R (HVLT-R), Trailmaking Test (Trails A & B), Controlled Oral Word Association " Test (COWAT) and Category Fluency, [place] Naming Test-2 (BNT-2), Clock Drawing Test, Stroop Color and Word Test, [place] Card Sorting Test-1 deck (WCST-64), Generalized Anxiety Disorder-7 Assessment (ADONIS-7) and Geriatric Depression Scale-Short Form (GDS-15).    MOANS norms were used for BNT, Trail Making Test A & B, COWAT & Category Fluency    DESCRIPTIVE PERFORMANCE KEY:    Labels for tests with Normal Distributions  Score Label Standard Score %ile Rank   Exceptionally high score  > 130 > 98   Above average score 120-129 91-97   High average score 110-119 75-90   Average score  25-74   Low average score 80-89 9-24   Below average score 70-79 2-8   Exceptionally low score < 70 < 2     Labels for tests with Non-Normal Distributions  Score Label %ile Rank   Within normal expectations/ limits score (WNL) > 24   Low average score 9-24   Below average score 2-8   Exceptionally low score < 2     The following test results utilize score labels as adapted from Cameron Styles, Tj Cano, Damaris Stovall, GIRMA Ramirez, Rashi Contreras Michael Westerveld & Conference Participatnts (2020): American Academy of Clinical Neuropsychology consensus conference statement on uniform labeling of performance test scores, The Clinical Neuropsychologist, DOI: 10.1080/[email or web address]. All scores contain some measure of error; scores are reported here as they are obtained by the individual (without reference to the range of error). These are meant as labels and not interpretation of performance. While other relevant comments regarding task performance are provided below, please see the Summary, Impressions, and Diagnosis sections of this report for interpretation of the scores and the cognitive profile as a whole, including what does and does not constitute impairment for this particular individual.    COVID-19-ERA NEUROPSYCHOLOGY LIMITATIONS:  Due to the ongoing COVID-19 pandemic, this  assessment was conducted with the examiner wearing [employer]-designated PPE and the patient wearing a face mask. The standard administration of these procedures involves direct face-to-face methods. The impact of applying non-standard administration methods has been evaluated only in part by scientific research. While every effort was made to simulate standard assessment practices, the diagnostic conclusions and recommendations for treatment provided in this report are being advanced with these limitations in mind.    BEHAVIORAL OBSERVATIONS:   Ms. Dia arrived on time and accompanied by her  to today's appointment. She was appropriately dressed and groomed. Gait and other motor activity appeared normal. She appeared alert and engaged, although mildly anxious and guarded about this evaluation. No vision or hearing difficulties were observed. Conversational speech was of normal rate, volume, and prosody. No word-finding pauses or paraphasias were noted. Her thought process appeared linear and goal-directed. No hallucinations or delusions were apparent. Insight appeared limited. Her mood was generally euthymic and her affect was appropriately reactive. Rapport was easily established and eye contact was appropriate.     During the testing session, Ms. Dia was alert throughout. She continued to be apprehensive about the testing and appeared mildly anxious, but was cooperative throughout the evaluation. She understood test instructions without difficulty. No frontal signs were observed behaviorally. Response latencies were within normal limits. Ms. Dia appeared adequately motivated and engaged easily during testing. Overall, the following results are considered a reasonably valid estimation of her current cognitive abilities.    OPTIMAL PREMORBID INTELLECT:  Optimal premorbid intellectual abilities were estimated as falling in the average range based on Ms. Dia's educational and occupational  histories and performance on tasks least likely to be affected by acquired brain dysfunction.    SUMMARY OF TEST RESULTS:  ORIENTATION. Performance on a mental status exam, assessing orientation to personal and current information, resulted in a below average score. She was oriented to personal information, place, the current year, and was able to correctly name the current [place] president. She looked at her phone when she was asked to name the day of the month and the day of the week. She stated that the current month was September (instead of October) and underestimated the current time by 67 minutes. She also was unable to recall the name of the previous [place] president.    ATTENTION/WORKING MEMORY. The patient's score on a measure sensitive to sustained auditory-verbal attention and concentration (WAIS-IV Digit Span) was classified as average, as she was able to recite up to 6 digits forward (an average score), up to 3 digits backward (a low average score), and up to 4 digits in sequence (an average score).      PROCESSING SPEED. Speeded digit-symbol coding was measured as an average score (WAIS-IV Coding). On a test of complex concentration that requires speeded numeric sequencing (Trails A), the patient's score was above average for completion time and without error. On the Stroop test, speeded color naming was average, as was speeded word reading.     LANGUAGE PROCESSING. Language comprehension appeared intact. Her score on a measure of verbal abstract reasoning was average (WAIS-IV Similarities). Confrontation naming was measured as a within normal limits score (59/60; BNT-2). The patient's performance on a test of phonemic fluency resulted in an above average score (COWAT), while her semantic fluency was low average with 3 set-loss errors and 1 repetition error (Category Fluency). Her writing sample was intact and there was no evidence of micrographia.     VISUOSPATIAL/CONSTRUCTIONAL SKILLS. The WAIS-IV  Perceptual Reasoning Index was measured as an average score (pro-rated CHEMO = 98), with average nonverbal abstract reasoning (Matrix Reasoning), and average visuoconstruction with three-dimensional blocks (Block Design). On a Clock Drawing Task, the patient was able to draw a large, well-formed [place] with equally spaced and correctly placed numbers. Her clock hands converged nicely in the center of the clock face and were differentiated by length.      LEARNING/MEMORY. On the WMS-IV Logical Memory subtest, immediate memory for two paragraph-length stories resulted in a below average score. After a 20-minute delay, the patient's score on the delayed recall trial was exceptionally low with a 6% retention rate. On the recognition trial, the patient's score was classified as average; however, she was observed to be guessing quite a bit and had a positive response bias.    On a 12-item verbal list-learning task (HVLT-R), the patient acquired up to 5 words (42%) of the word list by the 3rd and final learning trial (raw scores over trials = 5, 5, 5). Total learning acquisition was classified as a below average score. After a 20-minute delay, the patient recalled only 1 item (and made 2 intrusion errors), reflecting an exceptionally low score with a 20% retention rate. Her recognition discriminability score was below average, as she correctly recognized 11/12 items but also made 3 false-positive errors.     On a visual memory measure (WMS-IV Visual Reproduction), immediate recall of simple geometric figures was average, while delayed recall of the figures was exceptionally low with a 0% retention rate. Delayed recognition of the figures was low average, as she was only able to correctly recognize 2 out of the 7 figures in a multiple-choice format.     EXECUTIVE FUNCTIONS. Concept identification and the ability to generate alternative problem solving strategies was within normal limits for age on the [place] Card Sorting  Test. She completed 2 out of 3 categories (within normal limits) with a total of 28 errors, which is low average. Twenty of her errors were perseverative (low average) and there was 1 failure to maintain set. On a test of inhibition and cognitive flexibility (Stroop), the patient's score was low average and she made 2 errors. On a test that requires speeded alpha-numeric sequencing/cognitive set-shifting (Trails B), performance was average and without error. Verbal and nonverbal abstract reasoning were both average (WAIS-IV Similarities and Matrix Reasoning, respectively). Phonemic fluency was above average (COWAT). Performance on the Clock Drawing Test was impaired, as she was unable to accurately represent the specified analog time (she mixed up the hour and minute hands).    MOOD. On the GDS-15 a self report measure of depressive symptomatology, she obtained a score of 0, placing her in the range of normal depression. She denied suicidal ideation. On the ADONIS-7, a self-report measure of anxiety, she obtained a score of 0,  placing her in the range of minimal anxiety.    ____________________________________________________________________________________    SERVICES PROVIDED & TIME:  On-site Office Visit Details   A clinical interview/neurobehavioral status examination was conducted with the patient and documented. I thoroughly reviewed the medical record, selected the neuropsychological test battery, provided supervision to the trained examiner/technician, scored all of the neuropsychological tests (2+ tests), interpreted/integrated patient data and test results, and engaged in clinical decision making, treatment planning, report writing/preparation and provision of interactive feedback of test results on 11/8/2021. A trained examiner/technician administered and scored the neuropsychological tests (2+ tests).  Please see below for a breakdown of time spent and the associated codes billed for these  services.  Services   Time Spent  CPT Codes   Neurobehavioral Status Exam:  (e.g., clinical interview and neurobehavioral status exam, interpretation, report)   65 minutes   1 x 96116  0 x 96121   Neuropsychological Evaluation Services:   (e.g., integration, interpretation, treatment planning, clinical decision making, feedback via telehealth)   202 minutes   1 x 96132  2 x 96133   Neuropsychological Testing by Psychologist:  (e.g., test administration, scoring, 2+ tests administered)   51 minutes   1 x 96136  1 x 96137     Neuropsychological Testing by Trained Examiner/Technician:  (e.g., test administration, scoring, 2+ tests administered)   110 minutes   1 x 96138  3 x 96139   For diagnostic and coding purposes, Ms. Dia has a history of hypothyroidism, atrial fibrillation with RVR, history of splenic laceration that resulted in traumatic hemorrhagic shock and severe blood loss, and childhood history of polio. 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 Please note, all charges are filed at the completion of the Episode of Care and associated with the final encounter date (feedback session on 11/8/2021).

## 2021-10-26 NOTE — PROGRESS NOTES
The patient was seen for a neuropsychological evaluation for the purposes of diagnostic clarification and treatment planning. 110 minutes of face-to-face testing were provided by this writer.The patient was cooperative with testing. No concerns were brought to my attention. Please see Dr. Batista's report for a detailed description of the charges and interpretation and integration of the findings.

## 2021-11-08 ENCOUNTER — VIRTUAL VISIT (OUTPATIENT)
Dept: NEUROLOGY | Facility: CLINIC | Age: 76
End: 2021-11-08
Payer: COMMERCIAL

## 2021-11-08 DIAGNOSIS — G30.9 MILD NEUROCOGNITIVE DISORDER DUE TO ALZHEIMER'S DISEASE (H): Primary | ICD-10-CM

## 2021-11-08 DIAGNOSIS — F06.70 MILD NEUROCOGNITIVE DISORDER DUE TO ALZHEIMER'S DISEASE (H): Primary | ICD-10-CM

## 2021-11-08 PROCEDURE — 96137 PSYCL/NRPSYC TST PHY/QHP EA: CPT | Performed by: CLINICAL NEUROPSYCHOLOGIST

## 2021-11-08 PROCEDURE — 96133 NRPSYC TST EVAL PHYS/QHP EA: CPT | Mod: 95 | Performed by: CLINICAL NEUROPSYCHOLOGIST

## 2021-11-08 PROCEDURE — 96116 NUBHVL XM PHYS/QHP 1ST HR: CPT | Performed by: CLINICAL NEUROPSYCHOLOGIST

## 2021-11-08 PROCEDURE — 96139 PSYCL/NRPSYC TST TECH EA: CPT | Mod: 59 | Performed by: CLINICAL NEUROPSYCHOLOGIST

## 2021-11-08 PROCEDURE — 96138 PSYCL/NRPSYC TECH 1ST: CPT | Mod: 59 | Performed by: CLINICAL NEUROPSYCHOLOGIST

## 2021-11-08 PROCEDURE — 96132 NRPSYC TST EVAL PHYS/QHP 1ST: CPT | Mod: 95 | Performed by: CLINICAL NEUROPSYCHOLOGIST

## 2021-11-08 PROCEDURE — 96136 PSYCL/NRPSYC TST PHY/QHP 1ST: CPT | Performed by: CLINICAL NEUROPSYCHOLOGIST

## 2021-11-08 NOTE — PATIENT INSTRUCTIONS
SUMMARY OF FINDINGS:   Due to the ongoing COVID-19 pandemic, this assessment was conducted using PPE worn by the examiner and a face-mask for the patient. The standard administration of these tests involves direct face-to-face methods. The full impact of applying non-standard administration methods with PPE is not fully appreciated at this time. As such, the diagnostic conclusions and recommendations for treatment provided in this report are being advanced with caution.     With these limitations in mind, results of testing indicate that Ms. Dia is of estimated average premorbid intellectual functioning, and most of Ms. Dia's performances are generally commensurate with that estimate. However, she exhibits impairment on tests of learning and memory, along with a relative weakness in semantic fluency. Of note, her semantic fluency is significantly lower than her phonemic fluency, with scores falling in the lower limit of the low average range and the above average range, respectively. 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      With regard to learning/memory performances more specifically, Ms. Dia's verbal learning efficiency is mildly impaired, and her delayed free recall of that same information is exceptionally low (retention rates ranging from 6% to 20% after a 20-minute delay). Her learning of nonverbal (visual) information is intact and stronger than her verbal learning; however, she does not recall any information after 20 minutes on the nonverbal memory task. In general, her recall benefits slightly from prompts/cues on recognition testing on all memory tasks, but not as much as I would expect. This pattern of performance on memory testing appears most consistent with a  mild encoding deficit.     All other cognitive domains are within normal limits, including attention/concentration, processing speed, visuospatial/constructional ability, all other language processing abilities (including comprehension, confrontation naming, phonemic fluency, and word reading), and all other executive functions (including abstract reasoning and mental flexibility/set-shifting). Aside from her low average semantic fluency, her verbal abilities appear to remain a relative strength of hers. Ms. Dia also denies any significant emotional distress at this time, both in the clinical interview and on self-report measures.     IMPRESSIONS:    Overall, these results are mostly suggestive of mild cerebral dysfunction in the bilateral hippocampal structures, along with more subtle compromise of the dominant lateral temporal region. Some very subtle involvement of the prefrontal structures cannot be fully ruled out.    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 Along with her cognitive profile that reveals a mild encoding deficit and a significant discrepancy between semantic and verbal fluency (in favor of the latter), her memory loss began insidiously and has continued to progress over the past year. She also has a family history of dementia in her mother, who developed symptoms in her early 70's.     It is unlikely that the hemorrhagic shock and acute respiratory failure with hypoxia that occurred in 2018 is a primary causal factor at this point, particularly given that the patient's cognitive issues were not noticed until about a year ago. She also scored 5/5 on the Mini-Cog in 2019 (after those significant medical events), and most recently she scored 3/5 in May of this year (although this is  a very crude cognitive screening measure). That said, those medical events may have made her brain more vulnerable to developing a neurodegenerative condition.     DIAGNOSIS:  Mild Neurocognitive Disorder, possibly due to Alzheimer's disease     RECOMMENDATIONS:  1) Ongoing neurologic care and monitoring is recommended.      2) Despite Ms. Dia's memory difficulties, she has been able to remain fully independent in all of her daily activities. This may be in part due to maintaining a routine and living in the same home/neighborhood for 50 years. She is encouraged to continue doing so; however, I would recommend occasional oversight with the more complex/important tasks. For example, she should continue managing her medications and filling her own pillbox, but someone could double-check he work for accuracy once a month.      3) If not already done, completion of paperwork for advance directives and assignment of healthcare and financial Power of  should be considered at this time.     4) The Alzheimer s Association (http://www.alz.org/mnnd or 1-280.355.3306) has information about local support groups, respite services, other community resources, as well as a breadth of informational materials. Although she is not currently endorsing any significant emotional distress, if more individualized support is desired, the patient and/or her loved ones may benefit from establishing care with our psychologist, Adri Keenan PsyD, LP (at [facility] - [phone]). Dr. Keenan specializes in working with older adults with memory issues as well as their loved ones. If the patient is amenable, I will have our schedulers call her to set up an intake appointment.     5) The patient is encouraged to utilize cognitive compensatory strategies in daily life, including utilizing note pads, checklists, to-do lists, a calendar/planner, labeled alarm reminders, a GPS, a pillbox, and maintaining a daily morning and  nighttime routine and an organized living environment.     6) Ms. Dia is encouraged to remain physically, socially, and mentally active in order to optimize her brain health.     7) Neuropsychological follow-up is recommended in 18-24 months (or as clinically indicated) in order to monitor her cognitive status, confirm suspected etiology, and update recommendations. The current test data can be used as a baseline to which future comparisons can be made.        Thank you for allowing me to participate in Ms. Dia's care. Please contact me with any questions regarding the content of this report.          Marylin Batista PsyD, LP  Licensed Clinical Neuropsychologist  [facility] [address], [address]  Phone: [phone]      Cognitive Strategies    Take notes! Keep a small notepad with you in your purse/pocket/bag and write things down that you want to remember. You might also keep a notepad in a convenient location in your home (e.g., next to your telephone or calendar). Taking your notes in a note pad (instead of on multiple post-it notes) might help you stay organized, and you will also remember where to go to look for the notes that you took.    Create checklists, grocery lists, and to-do lists. Cross things off as you finish them.    Use a calendar or planner and get in the habit of checking it daily. Make it a part of your morning and/or nighttime routine to remind yourself of upcoming events, activities, or appointments. Cross the days off as they pass so that you can quickly glance at the calendar to know what day it is and what you have going on.    Set alarm reminders. For example, you can set an alarm to remind you to take your medications, turn off the oven, turn off the sprinkler outside, or to start getting ready for your appointment. If you use a smart phone, often times you can label the alarm that goes off so that you know what the alarm is for when it  yoni.    Use a pillbox to follow your medication regimen. A pillbox acts as a nice visual cue to remind us to take our medications. If you have trouble remembering whether or not you have already taken your medications today, a pillbox can be extremely helpful to help with this issue.    Use a GPS when driving to help you stay on route.    When having an important conversation or completing an important task, reduce as many distractions in the environment as you can. For example, turn off the TV or the music in the background. Turn off or silence your cell phone. Clear your work area of everything that you do not need for the task at hand.    Establish set locations for certain items. For example, if you keep your keys on a hook by your door, you will always know where to go to look for them.     Maintain a daily routine, especially for morning and nighttime tasks.

## 2021-11-08 NOTE — PROGRESS NOTES
"NEUROPSYCHOLOGY TELE-HEALTH FEEDBACK VISIT  [facility]    Telephone Visit  Terrie Dia is a 76 year old female who is being evaluated via a billable telephone visit.     The patient has been notified of the following:      \"This telephone visit will be conducted via a call between you and your provider. We have found that certain health care needs can be provided without the need for a physical exam.  This service lets us provide the care you need with a phone conversation. If during the course of the call the provider feels a telephone visit is not appropriate, you will not be charged for this service.\"     Patient has given verbal consent to a Telephone visit? Yes  Consent has been obtained for this service by 1 care team member: yes.    Visit Summary  The purpose of today s appointment was to provide feedback regarding Ms. Dia recent neuropsychological consult completed on 10/26/2021. Her daughter and  joined us for today's visit, with the patient's permission. We began the session by discussing her experience during the evaluation. I provided Ms. Dia with detailed feedback regarding her performance on cognitive testing and her pattern of cognitive strengths and weaknesses.  I discussed my overall impressions and recommendations and provided the opportunity for Ms. Dia to ask any questions that she had about the evaluation. At the end of the session, she indicated that she understood the results and that I had answered all of her questions.       Marylin Batista PsyD, LP  Licensed Clinical Neuropsychologist  [facility] [address], [address]  [place] [address]  Phone: [phone]      Telephone Visit Details    Type of service:  Telephone Visit  Start Time: 11:30 AM  End Time: 12:01 PM    Originating Location (pt. Location): Home    Distant Location (provider location):  Remote work for [facility] " [facility]    Mode of Communication:  Telephone    Terrie Dia was evaluated via a billable telephone visit. For diagnostic and coding purposes, Ms. Dia was referred for an evaluation of Mild Neurocognitive Disorder. As this is the final date for this Episode of Care (initiated on 10/26/2021) all charges for the entire Episode of Care will be filed today. Please see the 10/26/2021 evaluation for a detailed description of codes and services, including services provided today.      In brief:   1 x 96116  0 x 96121  1 x 96132  2 x 96133  1 x 96136  1 x 96137  1 x 96138  3 x 96139

## 2021-11-08 NOTE — LETTER
"    11/8/2021         RE: Terrie Dia  [address] [place] [address]        Dear Colleague,    Thank you for referring your patient, Terrie Dia, to the [facility]. Please see a copy of my visit note below.    NEUROPSYCHOLOGY TELE-HEALTH FEEDBACK VISIT  [facility]    Telephone Visit  Terrie Dia is a 76 year old female who is being evaluated via a billable telephone visit.     The patient has been notified of the following:      \"This telephone visit will be conducted via a call between you and your provider. We have found that certain health care needs can be provided without the need for a physical exam.  This service lets us provide the care you need with a phone conversation. If during the course of the call the provider feels a telephone visit is not appropriate, you will not be charged for this service.\"     Patient has given verbal consent to a Telephone visit? Yes  Consent has been obtained for this service by 1 care team member: yes.    Visit Summary  The purpose of today s appointment was to provide feedback regarding Ms. Dia recent neuropsychological consult completed on 10/26/2021. Her daughter and  joined us for today's visit, with the patient's permission. We began the session by discussing her experience during the evaluation. I provided Ms. Dia with detailed feedback regarding her performance on cognitive testing and her pattern of cognitive strengths and weaknesses.  I discussed my overall impressions and recommendations and provided the opportunity for Ms. Dia to ask any questions that she had about the evaluation. At the end of the session, she indicated that she understood the results and that I had answered all of her questions.       Marylin Batista PsyD, LP  Licensed Clinical Neuropsychologist  [facility]  [address], [address]  [place] [address]  Phone: " [phone]      Telephone Visit Details    Type of service:  Telephone Visit  Start Time: 11:30 AM  End Time: 12:01 PM    Originating Location (pt. Location): Home    Distant Location (provider location):  Remote work for [facility]    Mode of Communication:  Telephone    Terrie Dia was evaluated via a billable telephone visit. For diagnostic and coding purposes, Ms. Dia was referred for an evaluation of Mild Neurocognitive Disorder. As this is the final date for this Episode of Care (initiated on 10/26/2021) all charges for the entire Episode of Care will be filed today. Please see the 10/26/2021 evaluation for a detailed description of codes and services, including services provided today.      In brief:   1 x 96116  0 x 96121  1 x 96132  2 x 96133  1 x 96136  1 x 96137  1 x 96138  3 x 96139        Again, thank you for allowing me to participate in the care of your patient.        Sincerely,        Marylin Batista Psy.D, LP

## 2021-11-29 ENCOUNTER — OFFICE VISIT (OUTPATIENT)
Dept: NEUROLOGY | Facility: CLINIC | Age: 76
End: 2021-11-29
Payer: COMMERCIAL

## 2021-11-29 VITALS — SYSTOLIC BLOOD PRESSURE: 177 MMHG | DIASTOLIC BLOOD PRESSURE: 78 MMHG | HEART RATE: 68 BPM

## 2021-11-29 DIAGNOSIS — G31.84 MILD COGNITIVE IMPAIRMENT WITH MEMORY LOSS: Primary | ICD-10-CM

## 2021-11-29 PROCEDURE — 99214 OFFICE O/P EST MOD 30 MIN: CPT | Performed by: PSYCHIATRY & NEUROLOGY

## 2021-11-29 RX ORDER — DONEPEZIL HYDROCHLORIDE 5 MG/1
5 TABLET, FILM COATED ORAL AT BEDTIME
Qty: 60 TABLET | Refills: 4 | Status: SHIPPED | OUTPATIENT
Start: 2021-11-29 | End: 2023-10-09

## 2021-11-29 RX ORDER — MEMANTINE HYDROCHLORIDE 5 MG/1
TABLET ORAL
Qty: 162 TABLET | Refills: 0 | Status: SHIPPED | OUTPATIENT
Start: 2021-11-29 | End: 2022-01-19

## 2021-11-29 NOTE — NURSING NOTE
Chief Complaint   Patient presents with     Memory     F/u     ALYSHA Stephens on 11/29/2021 at 9:08 AM

## 2021-11-29 NOTE — PROGRESS NOTES
[facility] Follow Up Visit    Terrie Dia MRN# [id number]   Age: 76 year old YOB: 1945     Brief history of symptoms: The patient was initially seen in neurologic consultation on 8/23/2021 for evaluation of memory concerns. Please see the comprehensive neurologic consultation notes from those dates in the Epic records for details.     The patient's family had noted her to have periods of confusion, that she was repeating herself often, and had difficulties recalling having long conversations almost mid-conversation.  Her  had noted he was having difficulties with changes made to common drives (detours made things hard for her).  MoCA was 19/30 with deficits primarily in memory, visualspatial, and attention.  She was to have serum studies, MRI brain, OT for CPT, Neuropsychological testing, and trial Donepezil for MCI.    Interval history:   -MRI 8/26/2021 didn't show major signs of focal atrophy (I did not note any b/l hippocampal atrophy).  There was white matter changes in periventricular and atrial regions (b/l).  -B12 was normal  -B1 was normal  - OT on 9/24/2021 showed 21/30 score on MoCA.  CPT score of 5.1/5.6 thought to be consistent with mild cognitive functional disability.    - Neuropsychological testing 10/26/2021 with Dr. Marylin Batista was suggestive for impairment on learning/memory tests as well as weakness in semantic fluency.  Overall suggestion was for mild cerebral dysfunction in b/l hippocampal structures as well as subtle compromise in dominant lateral temporal region. She meets criteria for MCI, possible due to early stages of Alzheimer's dementia.    Today, the patient felt foggy in cognition while on 10 mg Donepezil at night, so she has since only been taking 5 mg.  She has limited driving, and her daughter is helping her more often with daily and weekly tasks. There are no new developments of events of confusion, getting lost, weight loss, visual hallucinations, or  behavioral concerns.  There are no events of concerning for stroke or seizure at this time.         Assessment and Plan:   Assessment:  MCI, amnestic type    The patient has maintained a good level of independent function at this time, with primary deficits in memory (short term).  She has good social help with her daughter, and is tolerating a lower dose of donepezil.  To aide in slowing progression of MCI into Alzheimer's, she would benefit from an adjunctive agent such as Namenda, considering her side effects with higher doses of acetylcholinesterase inhibitors. Otherwise, repeating her CPT, MoCA and neuropsychological testing in about a years time will be prudent in determining any progression which may lead to a dementia diagnosis.     Plan:  Memantine 5 mg Titrate to 20 mg every day   - 5 mg every day, 7 days, then   - 10 mg every day, 7 days, then   - 15 mg every day, 7 days, then   - 20 mg every day  Donepezil 5 mg QHS    Follow up in Neurology clinic in 9 months or earlier as needed should new concerns arise.    PATRICK Samuel D.O.   of Neurology    Total time today (30 min) in this patient encounter was spent on pre-charting, counseling and/or coordination of care.

## 2021-11-29 NOTE — LETTER
11/29/2021         RE: Terrie Dia  [address]  [place] [address]        Dear Colleague,    Thank you for referring your patient, Terrie Dia, to the [facility]. Please see a copy of my visit note below.    [facility] Neurology Follow Up Visit    Terrie Dia MRN# [id number]   Age: 76 year old YOB: 1945     Brief history of symptoms: The patient was initially seen in neurologic consultation on 8/23/2021 for evaluation of memory concerns. Please see the comprehensive neurologic consultation notes from those dates in the Epic records for details.     The patient's family had noted her to have periods of confusion, that she was repeating herself often, and had difficulties recalling having long conversations almost mid-conversation.  Her  had noted he was having difficulties with changes made to common drives (detours made things hard for her).  MoCA was 19/30 with deficits primarily in memory, visualspatial, and attention.  She was to have serum studies, MRI brain, OT for CPT, Neuropsychological testing, and trial Donepezil for MCI.    Interval history:   -MRI 8/26/2021 didn't show major signs of focal atrophy (I did not note any b/l hippocampal atrophy).  There was white matter changes in periventricular and atrial regions (b/l).  -B12 was normal  -B1 was normal  - OT on 9/24/2021 showed 21/30 score on MoCA.  CPT score of 5.1/5.6 thought to be consistent with mild cognitive functional disability.    - Neuropsychological testing 10/26/2021 with Dr. Marylin Batista was suggestive for impairment on learning/memory tests as well as weakness in semantic fluency.  Overall suggestion was for mild cerebral dysfunction in b/l hippocampal structures as well as subtle compromise in dominant lateral temporal region. She meets criteria for MCI, possible due to early stages of Alzheimer's dementia.    Today, the patient felt foggy in cognition while on 10 mg Donepezil  at night, so she has since only been taking 5 mg.  She has limited driving, and her daughter is helping her more often with daily and weekly tasks. There are no new developments of events of confusion, getting lost, weight loss, visual hallucinations, or behavioral concerns.  There are no events of concerning for stroke or seizure at this time.         Assessment and Plan:   Assessment:  MCI, amnestic type    The patient has maintained a good level of independent function at this time, with primary deficits in memory (short term).  She has good social help with her daughter, and is tolerating a lower dose of donepezil.  To aide in slowing progression of MCI into Alzheimer's, she would benefit from an adjunctive agent such as Namenda, considering her side effects with higher doses of acetylcholinesterase inhibitors. Otherwise, repeating her CPT, MoCA and neuropsychological testing in about a years time will be prudent in determining any progression which may lead to a dementia diagnosis.     Plan:  Memantine 5 mg Titrate to 20 mg every day   - 5 mg every day, 7 days, then   - 10 mg every day, 7 days, then   - 15 mg every day, 7 days, then   - 20 mg every day  Donepezil 5 mg QHS    Follow up in Neurology clinic in 9 months or earlier as needed should new concerns arise.    PATRICK Samuel D.O.   of Neurology    Total time today (30 min) in this patient encounter was spent on pre-charting, counseling and/or coordination of care.         Again, thank you for allowing me to participate in the care of your patient.        Sincerely,        Cedric Samuel, DO

## 2021-11-29 NOTE — PATIENT INSTRUCTIONS
You have a mild cognitive impairment.  I would not classify this as Alzheimer's dementia as of yet, given your high functional status and minimal signs of progression.  It certainly could be that in future visits, if your symptoms progress, we would reclassify your cognitive issues as Alzheimer's.  We are not at that point as of yet.    Continue with Donepezil 5 mg at bedtime.    Start Namenda 5 mg every day, and titrate up to 20 mg every day

## 2021-12-20 ENCOUNTER — OFFICE VISIT (OUTPATIENT)
Dept: FAMILY MEDICINE | Facility: CLINIC | Age: 76
End: 2021-12-20
Payer: COMMERCIAL

## 2021-12-20 VITALS
HEART RATE: 84 BPM | BODY MASS INDEX: 21.31 KG/M2 | RESPIRATION RATE: 19 BRPM | DIASTOLIC BLOOD PRESSURE: 80 MMHG | OXYGEN SATURATION: 99 % | SYSTOLIC BLOOD PRESSURE: 121 MMHG | WEIGHT: 132 LBS | TEMPERATURE: 97.9 F

## 2021-12-20 DIAGNOSIS — T88.7XXA MEDICATION SIDE EFFECTS: Primary | ICD-10-CM

## 2021-12-20 PROCEDURE — 99214 OFFICE O/P EST MOD 30 MIN: CPT | Performed by: PHYSICIAN ASSISTANT

## 2021-12-20 ASSESSMENT — ENCOUNTER SYMPTOMS
CONFUSION: 1
DECREASED CONCENTRATION: 1

## 2021-12-20 NOTE — PROGRESS NOTES
Patient presents with:  Medication Problem: feeling shaky after starting a new medication. Ana Lilia states feeling unwell and is wondering if two of her medication are the same and she possibly be overdosing. Or if the incrising  new dose is too much for her.      Clinical Decision Making: I did explain to the patient that these medications work differently, so she is not double dosing/overdosing by being on both of them.  I recommend that she follow-up with her neurologist to discuss potential for side effects.  She was given the phone number to reach out to her neurology care team.  Not want to alter the patient's medications at this time without her specialist being on board.  Patient agrees that she does not want to tamper the medications at this time.      ICD-10-CM    1. Medication side effects  T88.7XXA        Patient Instructions   1. Continue with your current medication plan.  The 2 medications that you have concerns of work on different receptors, so is not considered a problem to be on both of them at the same time.  It is not considered overdosing the medication.   2.  Please follow-up with [facility] neurology for further questions and concerns about potential side effects of these medications.  You can call [phone] to leave a message with your neurology care team.  Your neurology provider's name is Dr. Cedric Samuel.       HPI:  Terrie Dai is a 76 year old female who presents today complaining of shaky feeling after starting a new medication.  She was seen by neurology on 11/29/2021 for memory concerns.  She has a diagnosis of Mild cognitive impairment .  She was previously prescribed Donepezil 5 mg at bedtime. Her neurologist is aware that she is on both of these meds. She fears that she's not supposed to be on both of these meds at the same time.     History obtained from the patient.    Problem List:  2018-05: Atrial fibrillation with RVR (H)  2018-05: Acute  respiratory failure with hypoxia (H)  2018-05: Laceration of spleen, subsequent encounter  Splenic laceration, initial encounter  Hemoperitoneum  Acute blood loss anemia  Traumatic hemorrhagic shock, subsequent encounter      No past medical history on file.    Social History     Tobacco Use     Smoking status: Never Smoker     Smokeless tobacco: Never Used   Substance Use Topics     Alcohol use: Yes       Review of Systems   Psychiatric/Behavioral: Positive for confusion and decreased concentration.       Vitals:    12/20/21 1409   BP: 121/80   Pulse: 84   Resp: 19   Temp: 97.9  F (36.6  C)   SpO2: 99%   Weight: 59.9 kg (132 lb)       Physical Exam  Vitals and nursing note reviewed.   Constitutional:       General: She is not in acute distress.     Appearance: She is not toxic-appearing or diaphoretic.   HENT:      Head: Normocephalic and atraumatic.      Right Ear: External ear normal.      Left Ear: External ear normal.   Eyes:      Conjunctiva/sclera: Conjunctivae normal.   Pulmonary:      Effort: Pulmonary effort is normal. No respiratory distress.   Neurological:      Mental Status: She is alert.   Psychiatric:         Mood and Affect: Mood normal.         Behavior: Behavior normal.         Thought Content: Thought content normal.         Judgment: Judgment normal.         At the end of the encounter, I discussed results, diagnosis, medications. Discussed red flags for immediate return to clinic/ER, as well as indications for follow up if no improvement. Patient understood and agreed to plan. Patient was stable for discharge.

## 2021-12-20 NOTE — PATIENT INSTRUCTIONS
1. Continue with your current medication plan.  The 2 medications that you have concerns of work on different receptors, so is not considered a problem to be on both of them at the same time.  It is not considered overdosing the medication.   2.  Please follow-up with [facility] neurology for further questions and concerns about potential side effects of these medications.  You can call [phone] to leave a message with your neurology care team.  Your neurology provider's name is Dr. Cedric Samuel.

## 2021-12-21 ENCOUNTER — TELEPHONE (OUTPATIENT)
Dept: NEUROLOGY | Facility: CLINIC | Age: 76
End: 2021-12-21
Payer: COMMERCIAL

## 2021-12-21 NOTE — TELEPHONE ENCOUNTER
Health Call Center    Phone Message    May a detailed message be left on voicemail: yes     Reason for Call: Symptoms or Concerns     If patient has red-flag symptoms, warm transfer to triage line    Current symptom or concern: memory issues     Symptoms have been present for: ongoing    Has patient previously been seen for this? Yes    By : Dr. Samuel    Date: 11/29/2021    Are there any new or worsening symptoms? Yes: Patient calling stating she saw her primary care provider on 12/20/21 and was told to reach out to her neurologist as soon as possible regarding her memory concerns. Please call to discuss thank you.       Action Taken: Message routed to:  [facility] ([facility] neurology    Travel Screening: Not Applicable

## 2021-12-22 ENCOUNTER — TELEPHONE (OUTPATIENT)
Dept: NEUROLOGY | Facility: CLINIC | Age: 76
End: 2021-12-22
Payer: COMMERCIAL

## 2021-12-22 NOTE — TELEPHONE ENCOUNTER
I called pt with an update. Per Dr. Samuel no further recommendations. Continue plan as discussed at visit. Memantine 20mg daily in the morning and donepezil 5mg daily at bedtime.     She can check back in with us in 3-4 weeks to let us know how she is doing and if she is tolerating the medications well.     Gladys GOVEA

## 2021-12-22 NOTE — TELEPHONE ENCOUNTER
I called pt to discuss her concerns about her memory. Since her last visit with Dr. Samuel on 11/29 she has started memantine. Since 12/19 she reached goal dose of 20mg daily. She has not noticed any difference since starting the medication.     She says she cannot remember anything. I will update Dr. Samuel and see if he has some recommendations.     Gladys GOVEA

## 2022-10-10 ENCOUNTER — OFFICE VISIT (OUTPATIENT)
Dept: NEUROLOGY | Facility: CLINIC | Age: 77
End: 2022-10-10
Payer: COMMERCIAL

## 2022-10-10 VITALS — HEART RATE: 80 BPM | SYSTOLIC BLOOD PRESSURE: 170 MMHG | DIASTOLIC BLOOD PRESSURE: 79 MMHG

## 2022-10-10 DIAGNOSIS — G31.84 MILD COGNITIVE IMPAIRMENT WITH MEMORY LOSS: Primary | ICD-10-CM

## 2022-10-10 PROCEDURE — 99214 OFFICE O/P EST MOD 30 MIN: CPT | Performed by: PSYCHIATRY & NEUROLOGY

## 2022-10-10 NOTE — LETTER
10/10/2022         RE: Terrie Dia  [address]  [place] [address]        Dear Colleague,    Thank you for referring your patient, Terrie Dia, to the [facility]. Please see a copy of my visit note below.    [facility] Neurology Follow Up Visit    Terrie Dia MRN# [id number]   Age: 77 year old YOB: 1945     Brief history of symptoms: The patient was initially seen in neurologic consultation on 8/23/2021 for evaluation of memory concerns. Please see the comprehensive neurologic consultation notes from those dates in the Epic records for details.     Overall impression after our initial visit was for an MCI amnestic type.  She was to obtain serum tests, MRI brain, functional and neuropsychological testing, and start Donepezil.    Interval history:   - OT evaluation 9/24/2021 revealed a MoCA of 21/30 (primary deficits in memory) compared to prior 8/23/2021 score of 19/30.   CPT score was 5.1/5.6.  - Neuropsychology evaluation was performed by Dr. Marylin Batista PhD LP on 10/26/2021 with results suggestive for MCI amnestic type.  - MRI brain 8/26/2021 showed mild non-specific cerebral atrophy, and scattered but numerous white matter changes of the cerebrum (not BG, no cerebellum)    The patient feels she is tolerating her donepezil well, but she also may not be taking the medication.  She doesn't feel she has had any major changes in her behaviors.  Her daughter doesn't feel there have been any major new events, but does indicate that she may have some worsened cognitive issues towards the end of the day.  There is no behavioral changes noted, but just that Terrie can become slower in thought towards the end of the day.       Physical Exam:   Vitals: BP (!) 170/79 (BP Location: Right arm, Patient Position: Sitting, Cuff Size: Adult Regular)   Pulse 80    General: Seated comfortably in no acute distress.  HEENT: Neck supple with normal range of motion.   Skin: No  dalia  Neurologic:     Mental Status: Fully alert, attentive and oriented. Speech clear and fluent, no paraphasic errors.     Cranial Nerves: EOMI with normal smooth pursuit. Facial movements symmetric. Hearing not formally tested but intact to conversation.  No dysarthria.     Motor: No tremors or other abnormal movements observed.      Coordination: Finger-nose-finger without dysmetria.     Gait: Doesn't swing right arm at shoulder (indicates she fell on the shoudler or injured it recently). Turns easily. Normal stride.         Assessment and Plan:   Assessment:  Mild cognitive impairment, amnestic type    The patient has had no major new symptoms relating to her MCI since our last visit.  I do not think she should be managing her medication box alone anymore given she was unsure as to if she taking certain medications.  We discussed this today, and she will have her daughters help her with this task, especially given it was noted with CPT in the past.  She would benefit from having her CPT repeated, as well as a repeat of her neuropsychology testing in 6 months or so to determine progression.    Plan:  - Donepezil 10 mg every day  - Neuropsychology testing repeat  - OT for CPT repeat    Follow up in Neurology clinic in 1 year, or earlier as needed should new concerns arise.    PATRICK Samuel D.O.   of Neurology    Total time today (33 min) in this patient encounter was spent on pre-charting, counseling and/or coordination of care.         Again, thank you for allowing me to participate in the care of your patient.        Sincerely,        Cedric Samuel, DO

## 2022-10-10 NOTE — NURSING NOTE
Chief Complaint   Patient presents with     Follow Up     memory       ALYSHA Stephens on 10/10/2022 at 8:39 AM

## 2022-10-10 NOTE — PROGRESS NOTES
[facility] Neurology Follow Up Visit    Terrie Dia MRN# [id number]   Age: 77 year old YOB: 1945     Brief history of symptoms: The patient was initially seen in neurologic consultation on 8/23/2021 for evaluation of memory concerns. Please see the comprehensive neurologic consultation notes from those dates in the Epic records for details.     Overall impression after our initial visit was for an MCI amnestic type.  She was to obtain serum tests, MRI brain, functional and neuropsychological testing, and start Donepezil.    Interval history:   - OT evaluation 9/24/2021 revealed a MoCA of 21/30 (primary deficits in memory) compared to prior 8/23/2021 score of 19/30.   CPT score was 5.1/5.6.  - Neuropsychology evaluation was performed by Dr. Marylin Batista PhD LP on 10/26/2021 with results suggestive for MCI amnestic type.  - MRI brain 8/26/2021 showed mild non-specific cerebral atrophy, and scattered but numerous white matter changes of the cerebrum (not BG, no cerebellum)    The patient feels she is tolerating her donepezil well, but she also may not be taking the medication.  She doesn't feel she has had any major changes in her behaviors.  Her daughter doesn't feel there have been any major new events, but does indicate that she may have some worsened cognitive issues towards the end of the day.  There is no behavioral changes noted, but just that Terrie can become slower in thought towards the end of the day.       Physical Exam:   Vitals: BP (!) 170/79 (BP Location: Right arm, Patient Position: Sitting, Cuff Size: Adult Regular)   Pulse 80    General: Seated comfortably in no acute distress.  HEENT: Neck supple with normal range of motion.   Skin: No rashes  Neurologic:     Mental Status: Fully alert, attentive and oriented. Speech clear and fluent, no paraphasic errors.     Cranial Nerves: EOMI with normal smooth pursuit. Facial movements symmetric. Hearing not formally tested but intact to conversation.   No dysarthria.     Motor: No tremors or other abnormal movements observed.      Coordination: Finger-nose-finger without dysmetria.     Gait: Doesn't swing right arm at shoulder (indicates she fell on the shoudler or injured it recently). Turns easily. Normal stride.         Assessment and Plan:   Assessment:  Mild cognitive impairment, amnestic type    The patient has had no major new symptoms relating to her MCI since our last visit.  I do not think she should be managing her medication box alone anymore given she was unsure as to if she taking certain medications.  We discussed this today, and she will have her daughters help her with this task, especially given it was noted with CPT in the past.  She would benefit from having her CPT repeated, as well as a repeat of her neuropsychology testing in 6 months or so to determine progression.    Plan:  - Donepezil 10 mg every day  - Neuropsychology testing repeat  - OT for CPT repeat    Follow up in Neurology clinic in 1 year, or earlier as needed should new concerns arise.    PATRICK Samuel D.O.   of Neurology    Total time today (33 min) in this patient encounter was spent on pre-charting, counseling and/or coordination of care.

## 2023-06-19 ENCOUNTER — OFFICE VISIT (OUTPATIENT)
Dept: NEUROLOGY | Facility: CLINIC | Age: 78
End: 2023-06-19
Payer: COMMERCIAL

## 2023-06-19 DIAGNOSIS — G31.84 MILD COGNITIVE IMPAIRMENT WITH MEMORY LOSS: ICD-10-CM

## 2023-06-19 DIAGNOSIS — F02.80 MAJOR NEUROCOGNITIVE DISORDER DUE TO ALZHEIMER'S DISEASE (H): Primary | ICD-10-CM

## 2023-06-19 DIAGNOSIS — G30.9 MAJOR NEUROCOGNITIVE DISORDER DUE TO ALZHEIMER'S DISEASE (H): Primary | ICD-10-CM

## 2023-06-19 PROCEDURE — 96139 PSYCL/NRPSYC TST TECH EA: CPT | Mod: 59 | Performed by: CLINICAL NEUROPSYCHOLOGIST

## 2023-06-19 PROCEDURE — 96132 NRPSYC TST EVAL PHYS/QHP 1ST: CPT | Performed by: CLINICAL NEUROPSYCHOLOGIST

## 2023-06-19 PROCEDURE — 96138 PSYCL/NRPSYC TECH 1ST: CPT | Mod: 59 | Performed by: CLINICAL NEUROPSYCHOLOGIST

## 2023-06-19 PROCEDURE — 96137 PSYCL/NRPSYC TST PHY/QHP EA: CPT | Performed by: CLINICAL NEUROPSYCHOLOGIST

## 2023-06-19 PROCEDURE — 96136 PSYCL/NRPSYC TST PHY/QHP 1ST: CPT | Performed by: CLINICAL NEUROPSYCHOLOGIST

## 2023-06-19 PROCEDURE — 96133 NRPSYC TST EVAL PHYS/QHP EA: CPT | Performed by: CLINICAL NEUROPSYCHOLOGIST

## 2023-06-19 PROCEDURE — 96116 NUBHVL XM PHYS/QHP 1ST HR: CPT | Performed by: CLINICAL NEUROPSYCHOLOGIST

## 2023-06-19 NOTE — PROGRESS NOTES
NEUROPSYCHOLOGY CONSULT  [facility]    NAME: Terrie Dia    YOB: 1945   AGE: 77  EDU: 15  DATE OF EVALUATION: 6/19/2023    REASON FOR REFERRAL:  Ms. Dia is a 77 year old, right-handed, White female with hypothyroidism, atrial fibrillation with RVR, history of splenic laceration that resulted in traumatic hemorrhagic shock and severe blood loss (2018), and childhood history of polio.  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 She was referred for updated neurocognitive evaluation by her neurologist, Dr. Samuel from [facility] to assist with differential diagnosis and care planning.     DIAGNOSTIC SUMMARY:  Results of testing indicate that Ms. Dia is a woman of estimated average premorbid intellectual functioning whose performance is notable for borderline impaired semantic fluency and nonverbal learning and moderate to severely impaired verbal learning, verbal memory, and nonverbal memory.  In addition, variability was evident on measures of cognitive efficiency (ranging from borderline impaired to average), and executive functioning (ranging from severely impaired to average). These weaknesses were evident in the context of otherwise intact performance on measures of basic attention, aspects of language (abstraction, confrontation naming) and visuospatial reasoning skills. Finally, on self-report questionnaires, she denied any significant symptoms of depression or anxiety.     With the exception of basic attention and language, there is evidence of decline in all other cognitive domains when compared to previous testing in 2021.  Except for semantic fluency which continues to represent borderline impairment and is stable compared to 2021, language skills and attention skills are intact for her age and  "consistent with previous testing.  Cognitive efficiency is also intact for her age, but declined from much stronger scores in 2021.  Nonverbal reasoning skills remain intact but do represent a slight decline from previous testing. Learning declined across all 3 measures, and now represents more moderate impairment as compared to more mild deficits in 2021.  Memory is largely stable and severely impaired (performance was essentially at floor in 2021).  Of note, recognition memory performance has declined on 2 of 3 measures suggesting the slight benefit from cues she was obtaining in 2021, are no longer as beneficial.  Finally, performance declined on every executive task administered.  Whereas this previously reflected a largely intact domain in 2021, there is now evidence of deficit, although performance in th executive domain is highly variable with scores ranging from average to severely impaired.    I was able to share the above results along with my impressions that this profile now reflects notable decline over the last 18 months to 2 years with Ms. Dia on the day of testing.  Initially Ms. Dia listened but quickly indicated she did not want to hear anymore.  She opted to leave without discussion of formal diagnosis. I was able to briefly share some recommendations including that she should be taking her donepezil, but I am very concerned about her driving and recommended a driving evaluation and that her family become much more involved in providing support for her.  But I am not sure how much of this she heard as she was telling me that she was done and wanted to leave. She had initially declined for me to speak to her  as \"he knows all this,\" but at the end of the feedback she ultimately granted me permission to call him which I did and I shared with him my impressions and recommendations. I provided the opportunity for Ms. Dia to ask questions about the evaluation and results but she declined.  " Her  asked if I could speak and share this information with her daughter; however unfortunately I shared that I am unable to as Ms. Dia only granted me verbal consent to speak with him.  I did encourage him, however, to set up a follow-up appointment in Neurology with Dr. Samuel, and to be sure to include his daughter in that appointment so that the whole family could discuss these concerns as a group. He expressed understanding and agreed to do so. Both Ms. Dia and her  were encouraged to reach out to me (contact information included in the AVS) should any further questions or concerns arise in the future. (Ms. Dia provided verbal consent for me to talk to Rene- her ). I explained that I would send the impressions and recommendations from the report as part of the After Visit Summary (which will be directed to clinic staff to print and send by mail) and that Dr. Samuel would be receiving the full report as the consulting provider as would her PCP.     Summary for Providers  ASSESSMENT:    Borderline impairments identified in semantic fluency and nonverbal learning    Generally moderate to severe impairments in verbal learning and memory (both verbal and nonverbal)    Intact basic attention, aspects of language (abstraction, confrontation naming) and visuospatial reasoning skills    Variability was evident on measures of cognitive efficiency (ranging from borderline impaired to average), and executive functioning (ranging from severely impaired to average)    As compared to 2021 testing, declines evident across most tasks    While level of engagement could be a factor in testing (she was very frustrated with the process), not all scores are impaired and even with declines, she demonstrates intact performance across a number of domains (attention, most language tasks, visuosaptial reasoning) and even some measures within domains of weakness (phonemic fluency and deductive  reasoning within the executive domain), suggesting that she was able to engage relatively well on many tasks even if she was frustrated with the testing process    Most notably, she continues to show significant deficits in learning and memory with amnestic performance on 2 of 3 measures and only 13% retention on the third measure.  Further learning declined across all 3 measures as did recognition performance (indicating that she is not benefiting from cues as much as she did in 2021)    In her 2021 evaluation Dr. Batista was concerned about an emerging Alzheimer's process and I agree that this presentation is most consistent with Alzheimer's    There were definitely declines in executive skills, but the most prominent aspect of her presentation continues to be significant deficits in memory along with more mild deficits in aspects of semantic retrieval (semantic fluency).  Declines in executive functioning likely represent progression of the Alzheimer's process rather than a primary frontal syndrome.  Executive skills are still intact on several measures.     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  I also shared this with her  and was talking to him when she arrived home safely.    Strongly recommend she receive assistance with managing medications (making sure she is taking) and finances    Family should attend all appointments to make sure accurate information is conveyed and  instructions followed    Given her lack of insight/ resistance to accepting the severity of her deficits, strongly recommend activation of POA    Continue on donepezil if not medically contraindicated    Follow-up in Neurology with  and ideally other family members present (including virtually if need be), so that everybody can present and discuss how best to support her moving forward    Given dementia level impairment and frustration with/ resistance to the testing process, neuropsychological re-evaluation may not be helpful in clarifying etiology. As such, future testing is not recommended unless clinically indicated by her care team. Serial MoCA's could be a useful tool in tracking progression over time.     FEEDBACK:  Ms. Dia received the results of this evaluation on the day of testing.     Thank you for allowing me to participate in Ms. Dia's care.  Please contact me with any questions regarding the content of this report.      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 Dia moving forward  Driving and Activities of Daily Living    Given her profile characterized by variable executive skills and processing speed, it is strongly encouraged that Ms. Dia refrain from driving. If she would like to continue driving, a formal driving evaluation is strongly recommended. Possible options for formal driving evaluations would  be: [facility] ([phone]), [facility] ([phone]) or any option recommended by his physician.     Ms. Dia would benefit from help in her daily activities particularly in terms of managing medications (i.e., having a pillbox set up for her and having someone make sure that she is taking her medications regularly and as prescribed). She would also benefit from assistance with managing the finances.     It is strongly recommended that a family member or close friend accompany Ms. Dia to all appointments to ensure that accurate information is given to providers and instructions are followed. It will be helpful to present the information in brief, repeated segments and have her repeat back the information in her own words to ensure understanding. But ultimately, her caregivers should be in charge of following through with any recommendations.     It is recommended that Ms. Dia not venture into the community independently.  She is at high risk for being taken advantage of.  In addition, she is at risk for getting lost and turned around and may have significant problems finding her way home.    If not already done, completion of paperwork for advance directives and assignment of healthcare and financial power of  should be considered at this time.    If power of  is already in place, given her lack of insight or resistance to excepting the severity of her cognitive deficits, I do recommend activation of POA (both healthcare and financial).    To the extent that her family can continue to provide support and assistance, it seems that the current living situation is working.  However, if more support is needed in the future or if her family is unable to provide that support, Ms. Dia would benefit from increased supervision and support in her daily life so as to help her manage daily tasks such as cooking and cleaning as well as keeping track of medications and to ensure her  personal safety. An assisted living facility would be ideal so as to allow her some independence while also providing a structured and supportive environment.    Family Resources    It will be increasingly important for Ms. Dia and her family to have a strong support network in place. The Alzheimer s Association (http://www.alz.org/mnnd 1-404-914-2611) has information about local support groups as well as informational materials and a 24-hour hotline.   Physical and Emotional Health    If not medically contraindicated, Ms. Dia will benefit from continued use of a cognitive enhancing medication (I.e., donepezil).     It is important that Ms. Dia continue to adhere to her medication treatment regimen and follow a healthy diet so as to maintain her physical health, as this can have a significant impact on her physical, emotional, and cognitive functioning.     Ms. Dia does not appear to be struggling with any significant emotional symptoms. Behavioral activation techniques such as regular exercise (under the guidance of her physician), recreational activities and regular social interaction would likely be effective in helping Ms. Dia to continue to maintain her mood.   Memory and Organization    Ms. Dia is encouraged to continue to engage in stimulating activities, (i.e., reading, card games, puzzles) to keep her cognitively active.     In her daily life, Ms. Dia will continue to benefit from the use of compensation techniques. That is, she may find it helpful to post reminder notes around the house, make lists, and carry a small calendar so that she can feel more comfortable and confident in her ability to remember information. A daily planner could also be used as a memory book where important information is recorded and organized for future reference.     Ms. Dia may find it to helpful to create a system to establish set locations for certain items (i.e., keys) such that she always knows where to put  "them upon entering the house and where to look when she needs them. If she would like to keep certain items out of sight (i.e., a wallet), she could set up a specific hidden place to keep items and use that same place so as to ensure she can find the required item when needed.     Ms. Dia will do best in an environment where a lot of routines are established so that she can follow a regular pattern for her daily or weekly routines. She may become confused when deviating from this routine.     Ms. Dia demonstrates intact basic attention but profound memory impairments, thus, she should not be given instructions for tasks any more than a few minutes in the future.    Ms. Dia should be aware that she may not be able to process information as quickly and efficiently as she once could. Thus she should allow herself extra time to complete tasks and not try and work under extreme time constraints.   Follow-up    Given Ms. Dia's current dementia level impairment and frustration with/ resistance to the testing process, neuropsychological re-evaluation may not be helpful in clarifying etiology. As such, future testing is not recommended unless clinically indicated by her care team. Serial MoCA's could be a useful tool in tracking progression over time.     --------------------------------EXTENDED REPORT--------------------------------  Verbal consent for neuropsychological testing was received following the provision of information about the nature of the evaluation, and the opportunity to ask questions.     HISTORY OF PRESENTING PROBLEM:    Relevant History  Ms. Dia previously underwent neuropsychological evaluation with Dr. Marylin Batista on October 26, 2021.  Please see that report for full details.  In brief, Dr. Batista stated that the \"results are mostly suggestive of mild cerebral dysfunction in the bilateral hippocampal structures, along with more subtle compromise of the dominant lateral temporal region. Some " "very subtle involvement of the prefrontal structures cannot be fully ruled out. Given that she remains independent in her daily life despite her current cognitive impairment, she currently meets criteria for Mild Neurocognitive Disorder (i.e., amnestic mild cognitive impairment), most likely due to an early stage of Alzheimer's disease.\"    Dr. Batista also documented a steady decline in Mini-Cog scores including a perfect score (5/5) in 2019, and a 3/5 in 2021.      Ms. Dia was seen by Dr. Samuel in Neurology one year later on October 10, 2022  The patient feels she is tolerating her donepezil well, but she also may not be taking the medication.  She doesn't feel she has had any major changes in her behaviors.  Her daughter doesn't feel there have been any major new events, but does indicate that she may have some worsened cognitive issues towards the end of the day.  There is no behavioral changes noted, but just that Terrie can become slower in thought towards the end of the day.  The patient has had no major new symptoms relating to her MCI since our last visit.  I do not think she should be managing her medication box alone anymore given she was unsure as to if she taking certain medications.  We discussed this today, and she will have her daughters help her with this task, especially given it was noted with CPT in the past.  She would benefit from having her CPT repeated, as well as a repeat of her neuropsychology testing in 6 months or so to determine progression.  Plan:  - Donepezil 10 mg every day  - Neuropsychology testing repeat  - OT for CPT repeat     Most recently during a March 13, 2023 annual wellness visit with her primary care provider Radha Fairbanks MD, Ms. Dia was able to produce a clock on the Mini Cog but recalled no words (2/5), a further decline from 2021 performance. I could not locate a score for 2022.     Current Interview  Ms. Dia presented on her own and was a variable " (and somewhat reluctant) historian. She was able to provide the following information.     Ms. Dia presented initially stating that she did not understand why we were here.  I explained in detail the purpose of the (re)-evaluation and she again indicated that she has done this before and she does not understand why she is being asked to do it again.  I again explained the importance of tracking change over time and how her providers were concerned about progression of memory difficulties and she ultimately agreed to continue with the evaluation although expressing that she did not feel she really needed to be here as she has done this before.    At the present time, Ms. Dia denies any difficulty or concerns with any aspect of her thinking including memory, attention, language, speed of thinking, spatial skills, or problem-solving skills.  She was able to describe the ongoing COVID pandemic but estimated that it has been going on for less than 2 years (actually 3-1/2).    With regard to the activities of daily living, Ms. Dia reported that she continues to manage all aspects of her and her 's household without difficulty.  She stated that she is living in the same home where they have been for about 50 years.  She reported that she does all the cooking, cleaning, managing appointments and managing the family finances, all reportedly without difficulty.  She indicated that she takes only one medication for her thyroid and has no trouble remembering to take that.  I asked her if she was also taking donepezil and she could not say for sure but does not think she is taking it as she does not believe that she liked the side effects.  She was not sure when she stopped this.  (Per records, she was also not sure whether she was taking this medication when she saw Dr. Samuel last October. During feedback she stated that she was taking it.).  She stated that she continues to drive with no recent tickets,  accidents or incidents of becoming lost.    Additional Information  While she initially declined, Ms. Dia ultimately gave me permission to talk to her .  After the feedback I called Rene and he shared that he has become increasingly concerned with changes in his wife's thinking.  He noted that she is forgetful of information the moment he says it, and he has to repeat things over and over again, and even then she cannot seem to recall them.  He described for example how she will ask what he wants for dinner and she will agree to what he says but then serve something completely different and have no memory of what he asked for when he asks why dinner changed. He added that the quality of her cooking has also changed. He described how he has become more and more concerned with her driving as she seems to forget how to travel to places she has been to frequently.  He noted also that there have been problems with the bills and he has had to cover some bills himself with cash that she normally manages. (She becomes very defensive if he brings up any lapses so he takes care of it out of her view). Rene stated that their daughter is helping out but his wife has been very reluctant to acknowledge the problems and to take assistance and generally becomes defensive when he brings up any concerns.  He did share that they both have power of  in place (he believes for both healthcare and finances).    MEDICAL HISTORY:  Ms. Dia's current problem list includes   Patient Active Problem List   Diagnosis     Splenic laceration, initial encounter     Hemoperitoneum     Acute blood loss anemia     Traumatic hemorrhagic shock, subsequent encounter     Laceration of spleen, subsequent encounter     Atrial fibrillation with RVR (H)     Acute respiratory failure with hypoxia (H)     Ms. Dia denied any significant changes in her medical history over the last several years.  She denied experiencing a COVID  "infection.    Per Dr. Batista's 2021 report, \"Ms. Dia's medical history is significant for hypothyroidism, atrial fibrillation with RVR, and childhood history of polio when she was 5 or 6 years old. The only symptom she had with polio was an inability to walk, but this issue resolved over time. Ms. Dia also has history of subcapsular splenic hematoma after a colonoscopy that resulted in active extravasation, severe blood loss, acute respiratory failure with hypoxia, and traumatic hemorrhagic shock in May of 2018. Records note that she required 6 units of RBCs and 2 units of platelets. She underwent coil embolization of her splenic artery and was in the ICU/hospital for one week. The patient denied any history of significant head injuries, known seizure, stroke, heart attack, tremor, recent falls, balance issues, hallucinations and microsmia/anosmia. To her knowledge, she has never had COVID-19.\"    Today Ms. Dia was able to describe only in very broad terms the nature of this 2018 hemorrhagic shock.  She was able to state that she was very sick and \"hemorrhaging,\" and that this was not related to any kind of accident and it occurred following some kind of procedure, but she could not provide any further details.  \"I do not remember what it was.\"  She estimated that this occurred \"at least 10 years ago.\"    With regard to exercise, Ms. Dia stated that she maintains an active lifestyle with household chores and also walking the dog daily.     Ms. Dia denied any significant sensory changes or disturbance in appetite or sleep.  She indicated that she wakes rested most mornings and denied any unusual sleep behaviors.    Diagnostic studies:  An MRI of the brain from 8/26/2021 revealed \"No acute or subacute infarct. No mass, acute hemorrhage, or extra-axial fluid collections. Patchy nonspecific T2/FLAIR hyperintensities within the cerebral white matter most consistent with mild to moderate chronic microvascular " "ischemic change. Mild generalized cerebral atrophy. No hydrocephalus. Normal position of the cerebellar tonsils.\"    I could not locate any newer neuroimaging studies in the [facility] chart or in Care Everywhere.    Past Surgical History:   Procedure Laterality Date     IR VISCERAL ANGIOGRAM  5/3/2018     IR VISCERAL ANGIOGRAM  5/3/2018       Current medications include (per medical record):   Current Outpatient Medications:      acetaminophen (TYLENOL) 325 MG tablet, [ACETAMINOPHEN (TYLENOL) 325 MG TABLET] Take 2 tablets (650 mg total) by mouth every 4 (four) hours as needed. (Patient not taking: Reported on 10/10/2022), Disp: , Rfl: 0     bacitracin 500 unit/gram ointment, [BACITRACIN 500 UNIT/GRAM OINTMENT] Apply topically 3 (three) times a day. (Patient not taking: No sig reported), Disp: , Rfl: 0     donepezil (ARICEPT) 5 MG tablet, Take 1 tablet (5 mg) by mouth At Bedtime (Patient not taking: Reported on 10/10/2022), Disp: 60 tablet, Rfl: 4     glucosamine-chondroitin 500-400 mg cap, Take 1 capsule by mouth daily  (Patient not taking: Reported on 10/10/2022), Disp: , Rfl:      HYDROcodone-acetaminophen 5-325 mg per tablet, [HYDROCODONE-ACETAMINOPHEN 5-325 MG PER TABLET] Take 1-2 tablets by mouth every 4 (four) hours as needed. (Patient not taking: Reported on 10/10/2022), Disp: 10 tablet, Rfl: 0     levothyroxine (SYNTHROID, LEVOTHROID) 112 MCG tablet, [LEVOTHYROXINE (SYNTHROID, LEVOTHROID) 112 MCG TABLET] Take 112 mcg by mouth Daily at 6:00 am. , Disp: , Rfl:      metoprolol tartrate (LOPRESSOR) 25 MG tablet, [METOPROLOL TARTRATE (LOPRESSOR) 25 MG TABLET] Take 1 tablet (25 mg total) by mouth 2 (two) times a day. (Patient not taking: Reported on 10/10/2022), Disp: 60 tablet, Rfl: 0     vitamin B complex (B COMPLEX ORAL), Take 1 tablet by mouth daily  (Patient not taking: Reported on 10/10/2022), Disp: , Rfl: .    FAMILY HISTORY:   Family medical history is significant for:   Family History   Problem Relation Age of " "Onset     Heart Disease Mother      Cerebrovascular Disease Father    Per Dr. Batista's previous report, \"Family neurologic history is significant for dementia in her mother, who began having memory issues in her early 70's and was placed in an assisted living facility. She passed away shortly thereafter (around 72 or 75) due to complications related to dementia. The patient's father  of a stroke at age 60. There is no other known family neurologic history.\"    PSYCHIATRIC AND SUBSTANCE USE HISTORY:  With regard to her psychiatric history, Ms. Dia denied a history of past psychiatric conditions or mental health treatment. (This is consistent with the  report.) Today she described her mood as \"fine\".  She denied any suicidal ideation, plan or intent or hallucinations or delusions.     With regard to substance use, Ms. Dia indicated that she drinks a small glass of wine, at most, twice a month.  She denied any history of problematic alcohol use.  She denied any history of tobacco or recreational drug use.  This is consistent with the  report.    SOCIAL HISTORY:  Per Dr. Batista's previous report, \"Ms. Dia was born and raised in the small [facility] [place], [place]. [place]. Complications with her birth were denied, as were any developmental delays. She graduated high school and went on to complete a 3-year nursing degree at [employer]. She earned mostly A's and B's for grades throughout her schooling. Significant learning difficulties or developmental attention issues were denied. She worked as a nurse in the  ICU for 50 years, and retired of her own accord about 10 years ago. She has been  for 50 years, and they have 4 children together and 9 grandchildren. The patient and her  live together in their own home in [place], [place]. For leisure, she enjoys reading, gardening, embroidery, and is fairly active in her [employer] where she does quite a bit " "of volunteer work. She also attends aerobic classes at her gym twice weekly.\"    Today Ms. Dia indicated that she has been  for \"\"about 50 years,\" and then estimated that her 50th anniversary was last year.  She was able to state that she has 4 children including 1 son and estimated that she has \"12-15,\" grandchildren.  She could not guess exactly how many.  She stated that she was a  nurse, but indicated that she retired around age 52.    As noted above she continues to live with her  in their longtime home.  For fun she stated that she enjoys walking, reading, and gardening.    BEHAVIORAL OBSERVATIONS:   Ms. Dia arrived 20 minutes late and unaccompanied to today's appointment. She was appropriately dressed and groomed. She was alert and engaged during the interview. Gait was slow and mildly unsteady. She presented initially describing irritation at having to repeat this evaluation but she calmed and became more talkative and friendly as the interview progressed. By the end of the interview, her mood was euthmyic and her affect was appropriately reactive.  Eye contact was unremarkable. She was pleasant and cooperative. Rate, prosody, and content of speech were grossly normal. No significant word finding difficulties or paraphasic errors were evident. There was no evidence of a leeroy thought disorder; no hallucinations or delusions were apparent. Judgment and insight appeared fair.       When we transitioned to testing, Ms. Dia again expressed frustration about having to complete the evaluation again. She often appeared agitated but responded well to redirection. She attempted all tasks but was often quick to give up and required encouragement to keep trying, especially on memory measures. She would often comment \"this is stupid\" or \"I won't do it,\" but again, responded appropriately to encouragement from the examiner and persisted. She worked at a steady pace but was impulsive at times " in her responses.  Her performance was fully intact on embedded measures of objective effort. Instructions did at times require repetition as she would indicate she did not recall what she was supposed to do. No other significant barriers to testing were observed and the following is judged to be a reasonable representation of Ms. Dia's current cognitive strengths and weaknesses.    TESTS ADMINISTERED:   [place] Naming Test (BNT), Category Fluency- AFV (CAT), Clock Drawing, Controlled Oral Word Association Test CFL (COWAT), Generalized Anxiety Disorder-7 (ADONIS-7), Geriatric Depression Scale 15 (GDS), Carvajal Verbal Learning Test - R Form 3 (HVLT-R), Stroop Color and Word Test, Trail Making Test (TMT), WAIS-IV (Similarities, Coding, Block Design, Matrix Reasoning, Digit Span), WMS-IV (Logical Memory, Visual Reproduction), [place] Card Sorting Test-1 deck (WCST) and WMS-III Information and Orientation.    MOANS norms were used for BNT, CAT, COWAT, Stroop, TMT  (Raw scores in parentheses)  PPE was not worn by the examiner or the patient    While an attempt was made to match the battery administered in 2021 with alternative forms used where possible, exact comparisons are made with some caution given that current testing was performed without PPE while PPE was used in 2021 (unknown effects of PPE use on testing).     DESCRIPTIVE PERFORMANCE KEY:    Labels for tests with Normal Distributions  Score Label Standard Score %ile Rank   Exceptionally high score  > 130 > 98   Above average score 120-129 91-97   High average score 110-119 75-90   Average score  25-74   Low average score 80-89 9-24   Below average score 70-79 2-8   Exceptionally low score < 70 < 2     Labels for tests with Non-Normal Distributions  Score Label %ile Rank   Within normal expectations/ limits score (WNL) > 24   Low average score 9-24   Below average score 2-8   Exceptionally low score < 2     The following test results utilize score labels  "as adapted from Cameron Styles, Tj Cano, Damaris Stovall, GIRMA Ramirez, Karmen Trujillo, Rashi Melendrez, Lino Vega & Conference Participants (2020): American Academy of Clinical Neuropsychology consensus conference statement on uniform labeling of performance test scores, The Clinical Neuropsychologist, DOI: [id number]    All scores contain some measure of error; scores are reported here as they are obtained by the individual (without reference to the range of error). These are meant as labels and not interpretation of performance. While other relevant comments regarding task performance are provided below, please see the Assessment, Impressions and Diagnostic Summary sections of this report for interpretation of the scores and the cognitive profile as a whole, including what does and does not constitute impairment.    OPTIMAL PREMORBID INTELLECT:  Optimal premorbid intellectual abilities were estimated as falling in the average range based on Ms. Dia's educational and occupational histories and performance on tasks least likely to be affected by acquired brain dysfunction (i.e.,  hold tests ) administered in 2021.    SUMMARY OF TEST RESULTS:     Orientation, Attention and Processing Speed  Mental status exam was measured as a below average score for her age (8). She was oriented to personal information but reported her age as \"62 something\" (actually 77). She was able to report the day of the week and the correct city as well as the current and most recent presidents' names. She could not provide any further information about the date or clinic name and attempted to gather this information from a paper in her bag, which she ultimately pulled out and consulted despite being told not to. She was over an hour off when estimating the time. In 2021, she lost points only for the month, time of day and being unsure of the most recent president.     Performance on a " "measure of basic attention and working memory was assessed as a below average score (14). This reflected an average score for basic attention skills (LDF = 5) and working memory scores that ranged from an exceptionally low score (LDS = 3) to a low average score (LDB = 3). 2021 performance on this task was stronger as she earned an overall average score at that time, including an average score for basic attention and low average to average scores for working memory (LDF= 6, LDB = 3, LDS =4).     A simple sequencing task (41\") was assessed as an average score representing a significant decline from a previous above average score earned on this same task in 2021. A speeded word reading task (92) and a digit symbol substitution task (45) were both assessed as an average score and consistent with previous testing. A speeded color naming task (40) was assessed as a low average score representing a decline from a previous average score in 2021.    There is evidence of decline on measures of orientation (below expectation), working memory (below expectation) and cognitive efficiency (intact). Basic attention was stable and intact.     Language  Verbal abstraction skills (16) were assessed as a low average score representing a very slight decline from a low end of average score obtained in 2021.  Semantic fluency was measured as a below average to low average score (25) and consistent with previous testing.  Confrontation naming was assessed as a within normal limits score (58) and also consistent with previous testing.    Language skills are largely stable since 2021 and intact for abstraction and confrontation naming but continue to represent a weakness for semantic fluency.    Visuospatial Skills  Performance on a block construction task (24) resulted in an average score, consistent with previous testing. Performance on a pattern completion task (7) was measured as a low end of average score, representing a slight decline " from a previous high end of average score obtained in 2021.  Her copy of a series of geometric figures was assessed as an average score (41).  This task was not previously administered.    Nonverbal skills are largely stable and intact since 2021.    Learning and Memory  She was administered a measure of rote auditory verbal list learning that required her to learn a series of 12 words over three trials and retain and recall them over a delay. Her initial rate of learning (3,3,3) reflected an exceptionally low score. She retained and recalled no words (0% retention) after the delay for an exceptionally low score for delayed recall.  Recognition memory was measured as an exceptionally low score as she correctly identified 11 of the original words but also made 10 false positive errors.  In 2021, initial learning was slightly stronger and measured as a below average score but also with an exceptionally low score for delayed recall (albeit with 20% retention).  Recognition performance was also stronger in 2021 as she had identified 11 of the words but only made 3 false positive errors (low average score).    Immediate memory for two short stories was measured as a below average score (11). Delayed recall of these stories resulted in an exceptionally low score (1, 13% retention). Recognition memory was measured as a low average score (14/23).  Performance is largely stable on this task although in 2021, recognition performance had been stronger (17, average score).    Immediate nonverbal memory for a series of geometric figures was assessed as a low average score (18). Delayed recall of these figures was measured as an exceptionally low score (0). Recognition memory was assessed as a low average score.as she correctly identified 2 of the original 7 figures in a multiple choice format.  Performance on this task was notable for an average score earned on immediate nonverbal memory in 2021, but otherwise current  performance is very consistent with previous testing.    There is evidence of decline in nonverbal learning and recognition memory on 2 of the 3 tasks, but otherwise scores in learning and memory are largely stable and represent areas of significant weakness.    Executive Functioning  Working memory skills ranged from an exceptionally low score (LDS = 3) to a low average score (LDB = 3), representing a decline from low average to average scores for working memory in 2021. A complex sequencing and set shifting task was measured as an exceptionally low score (257 ). This task was notable for one error.  In 2021, she earned an average score on this task.  Phonemic fluency was assessed as an average score (30) representing a notable decline from an above average score earned in 2021. On a measure of deductive reasoning and problem-solving, overall performance was assessed as a low average score for her age and education in terms of the number of categories learned (1). Her performance was not characterized by an error prone or perseverative response style (NPE= 8, GA= 19) but was notable for multiple failures to maintain sat (3).  Performance was stronger on this task in 2021 as she had learned two categories at that time and only exhibited 1 failure to maintain sat. Her ability to inhibit an over-learned response was assessed as a below average score (15). Her performance on this task was notable for 3 errors.  She earned a low average score on this task in 2021 and completed the task with only 2 errors.  Her drawing of a clock was unremarkable.      Performance declined on every executive task as compared to 2021.  While performance on some measures are still assessed within normal limits, there is now evidence of below expectation scores on multiple executive measures.    Mood  On the Geriatric Depression Scale-15 (GDS), a self report measure of depressive symptomatology, she obtained a score of 1, placing her in the  range of no significant symptoms of depression.     On the Generalized Anxiety Disorder-7, a self-report measure of anxiety, she obtained a score of 0, placing her in the range of minimal anxiety.     EVALUATION SERVICES AND TIME:   A clinical interview/neurobehavioral status examination was conducted with the patient and documented. I thoroughly reviewed the medical record, selected the neuropsychological test battery, provided supervision to the individual who administered and scored the neuropsychological test battery, interpreted/integrated patient data and test results, engaged in clinical decision making, treatment planning, report writing/preparation and provided and documented interactive feedback of test results on the day of testing. A trained examiner/technician directly administered 2+ of the neuropsychological tests and I scored the neuropsychological tests (2 + tests). Please see below for a breakdown of time spent and the associated codes billed for these services.     Services   Time Spent  CPT Codes   Neurobehavioral Status Exam:  (e.g., face-to-face, interpretation, report) 32 minutes 1 x 96116   Neuropsychological Evaluation Services:   (e.g., integration, interpretation, treatment planning, clinical decision making, feedback)   215 minutes   1 x 96132  3 x 96133   Neuropsychological Testing by Psychologist:  (e.g., test administration, scoring, 2+ tests administered)   50 minutes   1 x 96136  1 x 96137   Neuropsychological Testing by Trained Examiner/Technician:  (e.g., test administration, scoring, 2+ tests administered)   90 minutes   1 x 96138  2 x 96139     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 Dia has a history of hypothyroidism, atrial fibrillation with RVR, history of splenic laceration that resulted in traumatic hemorrhagic shock and severe blood loss (2018), and childhood history of polio and was referred for an  evaluation of Mild cognitive impairment with memory loss. Feedback of results was provided on the day of testing.       Samira Todd, PhD, LP, ABPP  Clinical Neuropsychologist, [id number]  Board Certified in Clinical Neuropsychology    [facility] , [address]  [place] [address]  Phone:  [phone]

## 2023-06-19 NOTE — PROGRESS NOTES
The patient was seen for a neuropsychological evaluation for the purposes of diagnostic clarification and treatment planning. 90 minutes of face-to-face testing were provided by this writer. The patient was cooperative with testing. No concerns were brought to my attention. Please see Dr. Todd's report for a detailed description of the charges and interpretation and integration of the findings.

## 2023-06-19 NOTE — LETTER
6/19/2023         RE: Terrie Dia  [address]  [place] [address]        Dear Colleague,    Thank you for referring your patient, Terrie Dia, to the [facility] Please see a copy of my visit note below.    NEUROPSYCHOLOGY CONSULT  [facility]    NAME: Terrie Dia    YOB: 1945   AGE: 77  EDU: 15  DATE OF EVALUATION: 6/19/2023    REASON FOR REFERRAL:  Ms. Dia is a 77 year old, right-handed, White female with hypothyroidism, atrial fibrillation with RVR, history of splenic laceration that resulted in traumatic hemorrhagic shock and severe blood loss (2018), and childhood history of polio.  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 She was referred for updated neurocognitive evaluation by her neurologist, Dr. Samuel from [facility] to assist with differential diagnosis and care planning.     DIAGNOSTIC SUMMARY:  Results of testing indicate that Ms. Dia is a woman of estimated average premorbid intellectual functioning whose performance is notable for borderline impaired semantic fluency and nonverbal learning and moderate to severely impaired verbal learning, verbal memory, and nonverbal memory.  In addition, variability was evident on measures of cognitive efficiency (ranging from borderline impaired to average), and executive functioning (ranging from severely impaired to average). These weaknesses were evident in the context of otherwise intact performance on measures of basic attention, aspects of language (abstraction, confrontation naming) and visuospatial reasoning skills. Finally, on self-report questionnaires, she denied any significant symptoms of depression or anxiety.     With the exception of basic attention and language, there is evidence of  "decline in all other cognitive domains when compared to previous testing in 2021.  Except for semantic fluency which continues to represent borderline impairment and is stable compared to 2021, language skills and attention skills are intact for her age and consistent with previous testing.  Cognitive efficiency is also intact for her age, but declined from much stronger scores in 2021.  Nonverbal reasoning skills remain intact but do represent a slight decline from previous testing. Learning declined across all 3 measures, and now represents more moderate impairment as compared to more mild deficits in 2021.  Memory is largely stable and severely impaired (performance was essentially at floor in 2021).  Of note, recognition memory performance has declined on 2 of 3 measures suggesting the slight benefit from cues she was obtaining in 2021, are no longer as beneficial.  Finally, performance declined on every executive task administered.  Whereas this previously reflected a largely intact domain in 2021, there is now evidence of deficit, although performance in th executive domain is highly variable with scores ranging from average to severely impaired.    I was able to share the above results along with my impressions that this profile now reflects notable decline over the last 18 months to 2 years with Ms. Dia on the day of testing.  Initially Ms. Dia listened but quickly indicated she did not want to hear anymore.  She opted to leave without discussion of formal diagnosis. I was able to briefly share some recommendations including that she should be taking her donepezil, but I am very concerned about her driving and recommended a driving evaluation and that her family become much more involved in providing support for her.  But I am not sure how much of this she heard as she was telling me that she was done and wanted to leave. She had initially declined for me to speak to her  as \"he knows all this,\" " but at the end of the feedback she ultimately granted me permission to call him which I did and I shared with him my impressions and recommendations. I provided the opportunity for Ms. Dia to ask questions about the evaluation and results but she declined.  Her  asked if I could speak and share this information with her daughter; however unfortunately I shared that I am unable to as Ms. Dia only granted me verbal consent to speak with him.  I did encourage him, however, to set up a follow-up appointment in Neurology with Dr. Samuel, and to be sure to include his daughter in that appointment so that the whole family could discuss these concerns as a group. He expressed understanding and agreed to do so. Both Ms. Dia and her  were encouraged to reach out to me (contact information included in the AVS) should any further questions or concerns arise in the future. (Ms. Dia provided verbal consent for me to talk to Rene- her ). I explained that I would send the impressions and recommendations from the report as part of the After Visit Summary (which will be directed to clinic staff to print and send by mail) and that Dr. Samuel would be receiving the full report as the consulting provider as would her PCP.     Summary for Providers  ASSESSMENT:    Borderline impairments identified in semantic fluency and nonverbal learning    Generally moderate to severe impairments in verbal learning and memory (both verbal and nonverbal)    Intact basic attention, aspects of language (abstraction, confrontation naming) and visuospatial reasoning skills    Variability was evident on measures of cognitive efficiency (ranging from borderline impaired to average), and executive functioning (ranging from severely impaired to average)    As compared to 2021 testing, declines evident across most tasks    While level of engagement could be a factor in testing (she was very frustrated with the  process), not all scores are impaired and even with declines, she demonstrates intact performance across a number of domains (attention, most language tasks, visuosaptial reasoning) and even some measures within domains of weakness (phonemic fluency and deductive reasoning within the executive domain), suggesting that she was able to engage relatively well on many tasks even if she was frustrated with the testing process    Most notably, she continues to show significant deficits in learning and memory with amnestic performance on 2 of 3 measures and only 13% retention on the third measure.  Further learning declined across all 3 measures as did recognition performance (indicating that she is not benefiting from cues as much as she did in 2021)    In her 2021 evaluation Dr. Batista was concerned about an emerging Alzheimer's process and I agree that this presentation is most consistent with Alzheimer's    There were definitely declines in executive skills, but the most prominent aspect of her presentation continues to be significant deficits in memory along with more mild deficits in aspects of semantic retrieval (semantic fluency).  Declines in executive functioning likely represent progression of the Alzheimer's process rather than a primary frontal syndrome.  Executive skills are still intact on several measures.     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  I also shared this with her   and was talking to him when she arrived home safely.    Strongly recommend she receive assistance with managing medications (making sure she is taking) and finances    Family should attend all appointments to make sure accurate information is conveyed and instructions followed    Given her lack of insight/ resistance to accepting the severity of her deficits, strongly recommend activation of POA    Continue on donepezil if not medically contraindicated    Follow-up in Neurology with  and ideally other family members present (including virtually if need be), so that everybody can present and discuss how best to support her moving forward    Given dementia level impairment and frustration with/ resistance to the testing process, neuropsychological re-evaluation may not be helpful in clarifying etiology. As such, future testing is not recommended unless clinically indicated by her care team. Serial MoCA's could be a useful tool in tracking progression over time.     FEEDBACK:  Ms. Dia received the results of this evaluation on the day of testing.     Thank you for allowing me to participate in Ms. Dia's care.  Please contact me with any questions regarding the content of this report.      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 Dia moving forward  Driving and Activities of Daily Living    Given her profile  characterized by variable executive skills and processing speed, it is strongly encouraged that Ms. Dia refrain from driving. If she would like to continue driving, a formal driving evaluation is strongly recommended. Possible options for formal driving evaluations would be: [facility] ([phone]), [facility] program ([phone]) or any option recommended by his physician.     Ms. Dia would benefit from help in her daily activities particularly in terms of managing medications (i.e., having a pillbox set up for her and having someone make sure that she is taking her medications regularly and as prescribed). She would also benefit from assistance with managing the finances.     It is strongly recommended that a family member or close friend accompany Ms. Dia to all appointments to ensure that accurate information is given to providers and instructions are followed. It will be helpful to present the information in brief, repeated segments and have her repeat back the information in her own words to ensure understanding. But ultimately, her caregivers should be in charge of following through with any recommendations.     It is recommended that Ms. Dia not venture into the community independently.  She is at high risk for being taken advantage of.  In addition, she is at risk for getting lost and turned around and may have significant problems finding her way home.    If not already done, completion of paperwork for advance directives and assignment of healthcare and financial power of  should be considered at this time.    If power of  is already in place, given her lack of insight or resistance to excepting the severity of her cognitive deficits, I do recommend activation of POA (both healthcare and financial).    To the extent that her family can continue to provide support and assistance, it seems that the current living situation is working.  However, if more support is needed in  the future or if her family is unable to provide that support, Ms. Dia would benefit from increased supervision and support in her daily life so as to help her manage daily tasks such as cooking and cleaning as well as keeping track of medications and to ensure her personal safety. An assisted living facility would be ideal so as to allow her some independence while also providing a structured and supportive environment.    Family Resources    It will be increasingly important for Ms. Dia and her family to have a strong support network in place. The Alzheimer s Association (http://www.alz.org/mnnd 1-214.501.5329) has information about local support groups as well as informational materials and a 24-hour hotline.   Physical and Emotional Health    If not medically contraindicated, Ms. Dia will benefit from continued use of a cognitive enhancing medication (I.e., donepezil).     It is important that Ms. Dia continue to adhere to her medication treatment regimen and follow a healthy diet so as to maintain her physical health, as this can have a significant impact on her physical, emotional, and cognitive functioning.     Ms. Dia does not appear to be struggling with any significant emotional symptoms. Behavioral activation techniques such as regular exercise (under the guidance of her physician), recreational activities and regular social interaction would likely be effective in helping Ms. Dia to continue to maintain her mood.   Memory and Organization    Ms. Dia is encouraged to continue to engage in stimulating activities, (i.e., reading, card games, puzzles) to keep her cognitively active.     In her daily life, Ms. Dia will continue to benefit from the use of compensation techniques. That is, she may find it helpful to post reminder notes around the house, make lists, and carry a small calendar so that she can feel more comfortable and confident in her ability to remember information. A daily  planner could also be used as a memory book where important information is recorded and organized for future reference.     Ms. Dia may find it to helpful to create a system to establish set locations for certain items (i.e., keys) such that she always knows where to put them upon entering the house and where to look when she needs them. If she would like to keep certain items out of sight (i.e., a wallet), she could set up a specific hidden place to keep items and use that same place so as to ensure she can find the required item when needed.     Ms. Dia will do best in an environment where a lot of routines are established so that she can follow a regular pattern for her daily or weekly routines. She may become confused when deviating from this routine.     Ms. Dia demonstrates intact basic attention but profound memory impairments, thus, she should not be given instructions for tasks any more than a few minutes in the future.    Ms. Dia should be aware that she may not be able to process information as quickly and efficiently as she once could. Thus she should allow herself extra time to complete tasks and not try and work under extreme time constraints.   Follow-up    Given Ms. Dia's current dementia level impairment and frustration with/ resistance to the testing process, neuropsychological re-evaluation may not be helpful in clarifying etiology. As such, future testing is not recommended unless clinically indicated by her care team. Serial MoCA's could be a useful tool in tracking progression over time.     --------------------------------EXTENDED REPORT--------------------------------  Verbal consent for neuropsychological testing was received following the provision of information about the nature of the evaluation, and the opportunity to ask questions.     HISTORY OF PRESENTING PROBLEM:    Relevant History  Ms. Dia previously underwent neuropsychological evaluation with Dr. Marylin Batista on  "October 26, 2021.  Please see that report for full details.  In brief, Dr. Batista stated that the \"results are mostly suggestive of mild cerebral dysfunction in the bilateral hippocampal structures, along with more subtle compromise of the dominant lateral temporal region. Some very subtle involvement of the prefrontal structures cannot be fully ruled out. Given that she remains independent in her daily life despite her current cognitive impairment, she currently meets criteria for Mild Neurocognitive Disorder (i.e., amnestic mild cognitive impairment), most likely due to an early stage of Alzheimer's disease.\"    Dr. Batista also documented a steady decline in Mini-Cog scores including a perfect score (5/5) in 2019, and a 3/5 in 2021.      Ms. Dia was seen by Dr. Samuel in Neurology one year later on October 10, 2022  The patient feels she is tolerating her donepezil well, but she also may not be taking the medication.  She doesn't feel she has had any major changes in her behaviors.  Her daughter doesn't feel there have been any major new events, but does indicate that she may have some worsened cognitive issues towards the end of the day.  There is no behavioral changes noted, but just that Terrie can become slower in thought towards the end of the day.  The patient has had no major new symptoms relating to her MCI since our last visit.  I do not think she should be managing her medication box alone anymore given she was unsure as to if she taking certain medications.  We discussed this today, and she will have her daughters help her with this task, especially given it was noted with CPT in the past.  She would benefit from having her CPT repeated, as well as a repeat of her neuropsychology testing in 6 months or so to determine progression.  Plan:  - Donepezil 10 mg every day  - Neuropsychology testing repeat  - OT for CPT repeat     Most recently during a March 13, 2023 annual wellness visit with her " primary care provider Radha Fairbanks MD, Ms. Dia was able to produce a clock on the Mini Cog but recalled no words (2/5), a further decline from 2021 performance. I could not locate a score for 2022.     Current Interview  Ms. Dia presented on her own and was a variable (and somewhat reluctant) historian. She was able to provide the following information.     Ms. Dia presented initially stating that she did not understand why we were here.  I explained in detail the purpose of the (re)-evaluation and she again indicated that she has done this before and she does not understand why she is being asked to do it again.  I again explained the importance of tracking change over time and how her providers were concerned about progression of memory difficulties and she ultimately agreed to continue with the evaluation although expressing that she did not feel she really needed to be here as she has done this before.    At the present time, Ms. Dia denies any difficulty or concerns with any aspect of her thinking including memory, attention, language, speed of thinking, spatial skills, or problem-solving skills.  She was able to describe the ongoing COVID pandemic but estimated that it has been going on for less than 2 years (actually 3-1/2).    With regard to the activities of daily living, Ms. Dia reported that she continues to manage all aspects of her and her 's household without difficulty.  She stated that she is living in the same home where they have been for about 50 years.  She reported that she does all the cooking, cleaning, managing appointments and managing the family finances, all reportedly without difficulty.  She indicated that she takes only one medication for her thyroid and has no trouble remembering to take that.  I asked her if she was also taking donepezil and she could not say for sure but does not think she is taking it as she does not believe that she liked the side  effects.  She was not sure when she stopped this.  (Per records, she was also not sure whether she was taking this medication when she saw Dr. Samuel last October. During feedback she stated that she was taking it.).  She stated that she continues to drive with no recent tickets, accidents or incidents of becoming lost.    Additional Information  While she initially declined, Ms. Dia ultimately gave me permission to talk to her .  After the feedback I called Rene and he shared that he has become increasingly concerned with changes in his wife's thinking.  He noted that she is forgetful of information the moment he says it, and he has to repeat things over and over again, and even then she cannot seem to recall them.  He described for example how she will ask what he wants for dinner and she will agree to what he says but then serve something completely different and have no memory of what he asked for when he asks why dinner changed. He added that the quality of her cooking has also changed. He described how he has become more and more concerned with her driving as she seems to forget how to travel to places she has been to frequently.  He noted also that there have been problems with the bills and he has had to cover some bills himself with cash that she normally manages. (She becomes very defensive if he brings up any lapses so he takes care of it out of her view). Rene stated that their daughter is helping out but his wife has been very reluctant to acknowledge the problems and to take assistance and generally becomes defensive when he brings up any concerns.  He did share that they both have power of  in place (he believes for both healthcare and finances).    MEDICAL HISTORY:  Ms. Dia's current problem list includes   Patient Active Problem List   Diagnosis     Splenic laceration, initial encounter     Hemoperitoneum     Acute blood loss anemia     Traumatic hemorrhagic shock,  "subsequent encounter     Laceration of spleen, subsequent encounter     Atrial fibrillation with RVR (H)     Acute respiratory failure with hypoxia (H)     Ms. Dia denied any significant changes in her medical history over the last several years.  She denied experiencing a COVID infection.    Per Dr. Batista's 2021 report, \"Ms. Dia's medical history is significant for hypothyroidism, atrial fibrillation with RVR, and childhood history of polio when she was 5 or 6 years old. The only symptom she had with polio was an inability to walk, but this issue resolved over time. Ms. Dia also has history of subcapsular splenic hematoma after a colonoscopy that resulted in active extravasation, severe blood loss, acute respiratory failure with hypoxia, and traumatic hemorrhagic shock in May of 2018. Records note that she required 6 units of RBCs and 2 units of platelets. She underwent coil embolization of her splenic artery and was in the ICU/hospital for one week. The patient denied any history of significant head injuries, known seizure, stroke, heart attack, tremor, recent falls, balance issues, hallucinations and microsmia/anosmia. To her knowledge, she has never had COVID-19.\"    Today Ms. Dia was able to describe only in very broad terms the nature of this 2018 hemorrhagic shock.  She was able to state that she was very sick and \"hemorrhaging,\" and that this was not related to any kind of accident and it occurred following some kind of procedure, but she could not provide any further details.  \"I do not remember what it was.\"  She estimated that this occurred \"at least 10 years ago.\"    With regard to exercise, Ms. Dia stated that she maintains an active lifestyle with household chores and also walking the dog daily.     Ms. Dia denied any significant sensory changes or disturbance in appetite or sleep.  She indicated that she wakes rested most mornings and denied any unusual sleep behaviors.    Diagnostic " "studies:  An MRI of the brain from 8/26/2021 revealed \"No acute or subacute infarct. No mass, acute hemorrhage, or extra-axial fluid collections. Patchy nonspecific T2/FLAIR hyperintensities within the cerebral white matter most consistent with mild to moderate chronic microvascular ischemic change. Mild generalized cerebral atrophy. No hydrocephalus. Normal position of the cerebellar tonsils.\"    I could not locate any newer neuroimaging studies in the [facility] chart or in Care Everywhere.    Past Surgical History:   Procedure Laterality Date     IR VISCERAL ANGIOGRAM  5/3/2018     IR VISCERAL ANGIOGRAM  5/3/2018       Current medications include (per medical record):   Current Outpatient Medications:      acetaminophen (TYLENOL) 325 MG tablet, [ACETAMINOPHEN (TYLENOL) 325 MG TABLET] Take 2 tablets (650 mg total) by mouth every 4 (four) hours as needed. (Patient not taking: Reported on 10/10/2022), Disp: , Rfl: 0     bacitracin 500 unit/gram ointment, [BACITRACIN 500 UNIT/GRAM OINTMENT] Apply topically 3 (three) times a day. (Patient not taking: No sig reported), Disp: , Rfl: 0     donepezil (ARICEPT) 5 MG tablet, Take 1 tablet (5 mg) by mouth At Bedtime (Patient not taking: Reported on 10/10/2022), Disp: 60 tablet, Rfl: 4     glucosamine-chondroitin 500-400 mg cap, Take 1 capsule by mouth daily  (Patient not taking: Reported on 10/10/2022), Disp: , Rfl:      HYDROcodone-acetaminophen 5-325 mg per tablet, [HYDROCODONE-ACETAMINOPHEN 5-325 MG PER TABLET] Take 1-2 tablets by mouth every 4 (four) hours as needed. (Patient not taking: Reported on 10/10/2022), Disp: 10 tablet, Rfl: 0     levothyroxine (SYNTHROID, LEVOTHROID) 112 MCG tablet, [LEVOTHYROXINE (SYNTHROID, LEVOTHROID) 112 MCG TABLET] Take 112 mcg by mouth Daily at 6:00 am. , Disp: , Rfl:      metoprolol tartrate (LOPRESSOR) 25 MG tablet, [METOPROLOL TARTRATE (LOPRESSOR) 25 MG TABLET] Take 1 tablet (25 mg total) by mouth 2 (two) times a day. (Patient not taking: " "Reported on 10/10/2022), Disp: 60 tablet, Rfl: 0     vitamin B complex (B COMPLEX ORAL), Take 1 tablet by mouth daily  (Patient not taking: Reported on 10/10/2022), Disp: , Rfl: .    FAMILY HISTORY:   Family medical history is significant for:   Family History   Problem Relation Age of Onset     Heart Disease Mother      Cerebrovascular Disease Father    Per Dr. Batista's previous report, \"Family neurologic history is significant for dementia in her mother, who began having memory issues in her early 70's and was placed in an assisted living facility. She passed away shortly thereafter (around 72 or 75) due to complications related to dementia. The patient's father  of a stroke at age 60. There is no other known family neurologic history.\"    PSYCHIATRIC AND SUBSTANCE USE HISTORY:  With regard to her psychiatric history, Ms. Dia denied a history of past psychiatric conditions or mental health treatment. (This is consistent with the  report.) Today she described her mood as \"fine\".  She denied any suicidal ideation, plan or intent or hallucinations or delusions.     With regard to substance use, Ms. Dia indicated that she drinks a small glass of wine, at most, twice a month.  She denied any history of problematic alcohol use.  She denied any history of tobacco or recreational drug use.  This is consistent with the  report.    SOCIAL HISTORY:  Per Dr. Batista's previous report, \"Ms. Dia was born and raised in the small town of [place], [place]. [place]. Complications with her birth were denied, as were any developmental delays. She graduated high school and went on to complete a 3-year nursing degree at [employer]. She earned mostly A's and B's for grades throughout her schooling. Significant learning difficulties or developmental attention issues were denied. She worked as a nurse in the  ICU for 50 years, and retired of her own accord about 10 years ago. She has " "been  for 50 years, and they have 4 children together and 9 grandchildren. The patient and her  live together in their own home in [place], [place]. For leisure, she enjoys reading, gardening, embroidery, and is fairly active in her [employer] where she does quite a bit of volunteer work. She also attends aerobic classes at her gym twice weekly.\"    Today Ms. Dia indicated that she has been  for \"\"about 50 years,\" and then estimated that her 50th anniversary was last year.  She was able to state that she has 4 children including 1 son and estimated that she has \"12-15,\" grandchildren.  She could not guess exactly how many.  She stated that she was a  nurse, but indicated that she retired around age 52.    As noted above she continues to live with her  in their longtime home.  For fun she stated that she enjoys walking, reading, and gardening.    BEHAVIORAL OBSERVATIONS:   Ms. Dia arrived 20 minutes late and unaccompanied to today's appointment. She was appropriately dressed and groomed. She was alert and engaged during the interview. Gait was slow and mildly unsteady. She presented initially describing irritation at having to repeat this evaluation but she calmed and became more talkative and friendly as the interview progressed. By the end of the interview, her mood was euthmyic and her affect was appropriately reactive.  Eye contact was unremarkable. She was pleasant and cooperative. Rate, prosody, and content of speech were grossly normal. No significant word finding difficulties or paraphasic errors were evident. There was no evidence of a leeroy thought disorder; no hallucinations or delusions were apparent. Judgment and insight appeared fair.       When we transitioned to testing, Ms. Dia again expressed frustration about having to complete the evaluation again. She often appeared agitated but responded well to redirection. She attempted all tasks but was often " "quick to give up and required encouragement to keep trying, especially on memory measures. She would often comment \"this is stupid\" or \"I won't do it,\" but again, responded appropriately to encouragement from the examiner and persisted. She worked at a steady pace but was impulsive at times in her responses.  Her performance was fully intact on embedded measures of objective effort. Instructions did at times require repetition as she would indicate she did not recall what she was supposed to do. No other significant barriers to testing were observed and the following is judged to be a reasonable representation of Ms. Dia's current cognitive strengths and weaknesses.    TESTS ADMINISTERED:   Jackson Naming Test (BNT), Category Fluency- AFV (CAT), Clock Drawing, Controlled Oral Word Association Test CFL (COWAT), Generalized Anxiety Disorder-7 (ADONIS-7), Geriatric Depression Scale 15 (GDS), Carvajal Verbal Learning Test - R Form 3 (HVLT-R), Stroop Color and Word Test, Trail Making Test (TMT), WAIS-IV (Similarities, Coding, Block Design, Matrix Reasoning, Digit Span), WMS-IV (Logical Memory, Visual Reproduction), Wisconsin Card Sorting Test-1 deck (WCST) and WMS-III Information and Orientation.    MOANS norms were used for BNT, CAT, COWAT, Stroop, TMT  (Raw scores in parentheses)  PPE was not worn by the examiner or the patient    While an attempt was made to match the battery administered in 2021 with alternative forms used where possible, exact comparisons are made with some caution given that current testing was performed without PPE while PPE was used in 2021 (unknown effects of PPE use on testing).     DESCRIPTIVE PERFORMANCE KEY:    Labels for tests with Normal Distributions  Score Label Standard Score %ile Rank   Exceptionally high score  > 130 > 98   Above average score 120-129 91-97   High average score 110-119 75-90   Average score  25-74   Low average score 80-89 9-24   Below average score 70-79 2-8 " "  Exceptionally low score < 70 < 2     Labels for tests with Non-Normal Distributions  Score Label %ile Rank   Within normal expectations/ limits score (WNL) > 24   Low average score 9-24   Below average score 2-8   Exceptionally low score < 2     The following test results utilize score labels as adapted from Cameron Styles, Tj Cano, Damaris Stovall, GIRMA Ramirez, Karmen Trujillo, Rashi Melendrez, Lino Vega & Conference Participants (2020): American Academy of Clinical Neuropsychology consensus conference statement on uniform labeling of performance test scores, The Clinical Neuropsychologist, DOI: 10.1080/[email or web address]    All scores contain some measure of error; scores are reported here as they are obtained by the individual (without reference to the range of error). These are meant as labels and not interpretation of performance. While other relevant comments regarding task performance are provided below, please see the Assessment, Impressions and Diagnostic Summary sections of this report for interpretation of the scores and the cognitive profile as a whole, including what does and does not constitute impairment.    OPTIMAL PREMORBID INTELLECT:  Optimal premorbid intellectual abilities were estimated as falling in the average range based on Ms. Dia's educational and occupational histories and performance on tasks least likely to be affected by acquired brain dysfunction (i.e.,  hold tests ) administered in 2021.    SUMMARY OF TEST RESULTS:     Orientation, Attention and Processing Speed  Mental status exam was measured as a below average score for her age (8). She was oriented to personal information but reported her age as \"62 something\" (actually 77). She was able to report the day of the week and the correct city as well as the current and most recent presidents' names. She could not provide any further information about the date or clinic name and attempted to " "gather this information from a paper in her bag, which she ultimately pulled out and consulted despite being told not to. She was over an hour off when estimating the time. In 2021, she lost points only for the month, time of day and being unsure of the most recent president.     Performance on a measure of basic attention and working memory was assessed as a below average score (14). This reflected an average score for basic attention skills (LDF = 5) and working memory scores that ranged from an exceptionally low score (LDS = 3) to a low average score (LDB = 3). 2021 performance on this task was stronger as she earned an overall average score at that time, including an average score for basic attention and low average to average scores for working memory (LDF= 6, LDB = 3, LDS =4).     A simple sequencing task (41\") was assessed as an average score representing a significant decline from a previous above average score earned on this same task in 2021. A speeded word reading task (92) and a digit symbol substitution task (45) were both assessed as an average score and consistent with previous testing. A speeded color naming task (40) was assessed as a low average score representing a decline from a previous average score in 2021.    There is evidence of decline on measures of orientation (below expectation), working memory (below expectation) and cognitive efficiency (intact). Basic attention was stable and intact.     Language  Verbal abstraction skills (16) were assessed as a low average score representing a very slight decline from a low end of average score obtained in 2021.  Semantic fluency was measured as a below average to low average score (25) and consistent with previous testing.  Confrontation naming was assessed as a within normal limits score (58) and also consistent with previous testing.    Language skills are largely stable since 2021 and intact for abstraction and confrontation naming but continue " to represent a weakness for semantic fluency.    Visuospatial Skills  Performance on a block construction task (24) resulted in an average score, consistent with previous testing. Performance on a pattern completion task (7) was measured as a low end of average score, representing a slight decline from a previous high end of average score obtained in 2021.  Her copy of a series of geometric figures was assessed as an average score (41).  This task was not previously administered.    Nonverbal skills are largely stable and intact since 2021.    Learning and Memory  She was administered a measure of rote auditory verbal list learning that required her to learn a series of 12 words over three trials and retain and recall them over a delay. Her initial rate of learning (3,3,3) reflected an exceptionally low score. She retained and recalled no words (0% retention) after the delay for an exceptionally low score for delayed recall.  Recognition memory was measured as an exceptionally low score as she correctly identified 11 of the original words but also made 10 false positive errors.  In 2021, initial learning was slightly stronger and measured as a below average score but also with an exceptionally low score for delayed recall (albeit with 20% retention).  Recognition performance was also stronger in 2021 as she had identified 11 of the words but only made 3 false positive errors (low average score).    Immediate memory for two short stories was measured as a below average score (11). Delayed recall of these stories resulted in an exceptionally low score (1, 13% retention). Recognition memory was measured as a low average score (14/23).  Performance is largely stable on this task although in 2021, recognition performance had been stronger (17, average score).    Immediate nonverbal memory for a series of geometric figures was assessed as a low average score (18). Delayed recall of these figures was measured as an  exceptionally low score (0). Recognition memory was assessed as a low average score.as she correctly identified 2 of the original 7 figures in a multiple choice format.  Performance on this task was notable for an average score earned on immediate nonverbal memory in 2021, but otherwise current performance is very consistent with previous testing.    There is evidence of decline in nonverbal learning and recognition memory on 2 of the 3 tasks, but otherwise scores in learning and memory are largely stable and represent areas of significant weakness.    Executive Functioning  Working memory skills ranged from an exceptionally low score (LDS = 3) to a low average score (LDB = 3), representing a decline from low average to average scores for working memory in 2021. A complex sequencing and set shifting task was measured as an exceptionally low score (257 ). This task was notable for one error.  In 2021, she earned an average score on this task.  Phonemic fluency was assessed as an average score (30) representing a notable decline from an above average score earned in 2021. On a measure of deductive reasoning and problem-solving, overall performance was assessed as a low average score for her age and education in terms of the number of categories learned (1). Her performance was not characterized by an error prone or perseverative response style (NPE= 8, AK= 19) but was notable for multiple failures to maintain sat (3).  Performance was stronger on this task in 2021 as she had learned two categories at that time and only exhibited 1 failure to maintain sat. Her ability to inhibit an over-learned response was assessed as a below average score (15). Her performance on this task was notable for 3 errors.  She earned a low average score on this task in 2021 and completed the task with only 2 errors.  Her drawing of a clock was unremarkable.      Performance declined on every executive task as compared to 2021.  While  performance on some measures are still assessed within normal limits, there is now evidence of below expectation scores on multiple executive measures.    Mood  On the Geriatric Depression Scale-15 (GDS), a self report measure of depressive symptomatology, she obtained a score of 1, placing her in the range of no significant symptoms of depression.     On the Generalized Anxiety Disorder-7, a self-report measure of anxiety, she obtained a score of 0, placing her in the range of minimal anxiety.     EVALUATION SERVICES AND TIME:   A clinical interview/neurobehavioral status examination was conducted with the patient and documented. I thoroughly reviewed the medical record, selected the neuropsychological test battery, provided supervision to the individual who administered and scored the neuropsychological test battery, interpreted/integrated patient data and test results, engaged in clinical decision making, treatment planning, report writing/preparation and provided and documented interactive feedback of test results on the day of testing. A trained examiner/technician directly administered 2+ of the neuropsychological tests and I scored the neuropsychological tests (2 + tests). Please see below for a breakdown of time spent and the associated codes billed for these services.     Services   Time Spent  CPT Codes   Neurobehavioral Status Exam:  (e.g., face-to-face, interpretation, report) 32 minutes 1 x 96116   Neuropsychological Evaluation Services:   (e.g., integration, interpretation, treatment planning, clinical decision making, feedback)   215 minutes   1 x 96132  3 x 96133   Neuropsychological Testing by Psychologist:  (e.g., test administration, scoring, 2+ tests administered)   50 minutes   1 x 96136  1 x 96137   Neuropsychological Testing by Trained Examiner/Technician:  (e.g., test administration, scoring, 2+ tests administered)   90 minutes   1 x 96138  2 x 96139     Diagnosis:  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 Dia has a history of hypothyroidism, atrial fibrillation with RVR, history of splenic laceration that resulted in traumatic hemorrhagic shock and severe blood loss (2018), and childhood history of polio and was referred for an evaluation of Mild cognitive impairment with memory loss. Feedback of results was provided on the day of testing.       Samira Todd, PhD, LP, [facility]  Clinical Neuropsychologist, [id number]  Board Certified in Clinical Neuropsychology    [facility] [address] , [address]  [place] [address]  Phone:  [phone]    The patient was seen for a neuropsychological evaluation for the purposes of diagnostic clarification and treatment planning. 90 minutes of face-to-face testing were provided by this writer. The patient was cooperative with testing. No concerns were brought to my attention. Please see Dr. Todd's report for a detailed description of the charges and interpretation and integration of the findings.        Again, thank you for allowing me to participate in the care of your patient.        Sincerely,        Samira Tdod, PhD LP

## 2023-06-20 ENCOUNTER — TELEPHONE (OUTPATIENT)
Dept: NEUROLOGY | Facility: CLINIC | Age: 78
End: 2023-06-20

## 2023-06-20 NOTE — TELEPHONE ENCOUNTER
Spouse Rene called and he is feeling that his wife Meka should be able to see a Neurologist sooner than 10/9. That is the first available appt. with Dr Samuel. He thought it sounded more urgent and thought you might be able to get her seen sooner.

## 2023-06-22 NOTE — PATIENT INSTRUCTIONS
DIAGNOSTIC IMPRESSIONS (from 6/19/2023 Neuropsychology Consult):    Results  Mild weaknesses in word retrieval and spatial learning  Severe deficits in verbal learning, verbal memory, and spatial memory  Variable performance on measures of speeded processing and problem solving  Intact attention, spatial skills, and most language skills    Diagnosis  Major Neurocognitive Disorder due to Alzheimers Disease    RECOMMENDATIONS:  Follow-up in Neurology with  and ideally other family members present (including virtually if need be), so that everybody can be present and discuss how best to support Ms. Dia moving forward  Driving and Activities of Daily Living  Given her profile characterized by variable executive skills and processing speed, it is strongly encouraged that Ms. Dia refrain from driving. If she would like to continue driving, a formal driving evaluation is strongly recommended. Possible options for formal driving evaluations would be: [facility] ([phone]), [facility] ([phone]) or any option recommended by his physician.   Ms. Dia would benefit from help in her daily activities particularly in terms of managing medications (i.e., having a pillbox set up for her and having someone make sure that she is taking her medications regularly and as prescribed). She would also benefit from assistance with managing the finances.   It is strongly recommended that a family member or close friend accompany Ms. Dia to all appointments to ensure that accurate information is given to providers and instructions are followed. It will be helpful to present the information in brief, repeated segments and have her repeat back the information in her own words to ensure understanding. But ultimately, her caregivers should be in charge of following through with any recommendations.   It is recommended that Ms. Dia not venture into the community independently.  She is at high risk for being  taken advantage of.  In addition, she is at risk for getting lost and turned around and may have significant problems finding her way home.  If not already done, completion of paperwork for advance directives and assignment of healthcare and financial power of  should be considered at this time.  If power of  is already in place, given her lack of insight or resistance to excepting the severity of her cognitive deficits, I do recommend activation of POA (both healthcare and financial).  To the extent that her family can continue to provide support and assistance, it seems that the current living situation is working.  However, if more support is needed in the future or if her family is unable to provide that support, Ms. Dia would benefit from increased supervision and support in her daily life so as to help her manage daily tasks such as cooking and cleaning as well as keeping track of medications and to ensure her personal safety. An assisted living facility would be ideal so as to allow her some independence while also providing a structured and supportive environment.    Family Resources  It will be increasingly important for Ms. Dia and her family to have a strong support network in place. The Alzheimer s Association (http://www.alz.org/mnnd 3-414-039-7395) has information about local support groups as well as informational materials and a 24-hour hotline.   Physical and Emotional Health  If not medically contraindicated, Ms. Dia will benefit from continued use of a cognitive enhancing medication (I.e., donepezil).   It is important that Ms. Dia continue to adhere to her medication treatment regimen and follow a healthy diet so as to maintain her physical health, as this can have a significant impact on her physical, emotional, and cognitive functioning.   Ms. Dia does not appear to be struggling with any significant emotional symptoms. Behavioral activation techniques such as regular  exercise (under the guidance of her physician), recreational activities and regular social interaction would likely be effective in helping Ms. Dia to continue to maintain her mood.   Memory and Organization  Ms. Dia is encouraged to continue to engage in stimulating activities, (i.e., reading, card games, puzzles) to keep her cognitively active.   In her daily life, Ms. Dia will continue to benefit from the use of compensation techniques. That is, she may find it helpful to post reminder notes around the house, make lists, and carry a small calendar so that she can feel more comfortable and confident in her ability to remember information. A daily planner could also be used as a memory book where important information is recorded and organized for future reference.   Ms. Dia may find it to helpful to create a system to establish set locations for certain items (i.e., keys) such that she always knows where to put them upon entering the house and where to look when she needs them. If she would like to keep certain items out of sight (i.e., a wallet), she could set up a specific hidden place to keep items and use that same place so as to ensure she can find the required item when needed.   Ms. Dia will do best in an environment where a lot of routines are established so that she can follow a regular pattern for her daily or weekly routines. She may become confused when deviating from this routine.   Ms. Dia demonstrates intact basic attention but profound memory impairments, thus, she should not be given instructions for tasks any more than a few minutes in the future.  Ms. Dia should be aware that she may not be able to process information as quickly and efficiently as she once could. Thus she should allow herself extra time to complete tasks and not try and work under extreme time constraints.   Follow-up  Given Ms. Dia's current dementia level impairment and frustration with/ resistance to the  testing process, neuropsychological re-evaluation may not be helpful in clarifying etiology. As such, future testing is not recommended unless clinically indicated by her care team. Serial MoCA's could be a useful tool in tracking progression over time.

## 2023-10-09 ENCOUNTER — OFFICE VISIT (OUTPATIENT)
Dept: NEUROLOGY | Facility: CLINIC | Age: 78
End: 2023-10-09
Payer: COMMERCIAL

## 2023-10-09 VITALS — HEART RATE: 70 BPM | DIASTOLIC BLOOD PRESSURE: 99 MMHG | SYSTOLIC BLOOD PRESSURE: 173 MMHG

## 2023-10-09 DIAGNOSIS — F02.80 MAJOR NEUROCOGNITIVE DISORDER DUE TO ALZHEIMER'S DISEASE (H): Primary | ICD-10-CM

## 2023-10-09 DIAGNOSIS — G31.84 MILD COGNITIVE IMPAIRMENT WITH MEMORY LOSS: ICD-10-CM

## 2023-10-09 DIAGNOSIS — G30.9 MAJOR NEUROCOGNITIVE DISORDER DUE TO ALZHEIMER'S DISEASE (H): Primary | ICD-10-CM

## 2023-10-09 PROCEDURE — 99215 OFFICE O/P EST HI 40 MIN: CPT | Performed by: PSYCHIATRY & NEUROLOGY

## 2023-10-09 RX ORDER — DONEPEZIL HYDROCHLORIDE 5 MG/1
TABLET, FILM COATED ORAL
Qty: 180 TABLET | Refills: 0 | Status: SHIPPED | OUTPATIENT
Start: 2023-10-09 | End: 2023-12-08

## 2023-10-09 NOTE — NURSING NOTE
Chief Complaint   Patient presents with    Follow Up     Return       Magaly Simms MA on 10/9/2023 at 8:37 AM

## 2023-10-09 NOTE — PROGRESS NOTES
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 Please see the comprehensive neurologic consultation notes from those dates in the Epic records for details.     Prior evaluations included:  - OT evaluation 9/24/2021 revealed a MoCA of 21/30 (primary deficits in memory) compared to prior 8/23/2021 score of 19/30.   CPT score was 5.1/5.6.  - Neuropsychology evaluation was performed by Dr. Marylin Batista PhD LP on 10/26/2021 with results suggestive for MCI amnestic type.  - MRI brain 8/26/2021 showed mild non-specific cerebral atrophy, and scattered but numerous white matter changes of the cerebrum (not BG, no cerebellum)    At our last visit, she was to repeat CPT evaluation, neuropsychological evaluation, and continue with Donepezil.    Interval history:   - Neuropsychological testing was done 6/19/2023 with Dr. Samira Todd PhD and showed notable decline over the last 18 months to 2 years with updated diagnosis being that of Major Neurocognitive disorder due to Alzheimer's disease.  There was a noted concern for her driving, and communication with her family, and it was recommended multiple family members join her at upcomming provider appointments to go over her decline and likely increased functional needs.  - No updated CPT was done    Today, the patient is here with her daughter.  They help her with her medication management.  She isn't taking donepezil, perhaps for months.  While her day to day routine has stayed the same, she has required increased help from her family in the last year.  She has her daughter reviewing bills, medical appointments, and are offering support.  There is little concern for depression in the patient according to her daughter.  She is exercising, working the  garden, and doing well socially. She is still living in the same house (53 years).      Physical Exam:   Vitals: BP (!) 173/99 (BP Location: Right arm, Patient Position: Sitting)   Pulse 70    General: Seated comfortably in no acute distress.  Neurologic:     Mental Status: Fully alert, attentive and oriented. Speech clear and fluent, no paraphasic errors.     Cranial Nerves: EOMI with normal smooth pursuit. Facial movements symmetric. Hearing not formally tested but intact to conversation.  No dysarthria.         Assessment and Plan:   Assessment:  Alzheimer's dementia    The patient, her daughter, and I spent most of the visit going over her updated neuropsychology testing and ultimately her transition from MCI amnestic type to Alzheimer's dementia.  At this time, the patient still has capacity for most decision making in her life, but it is still unclear as to what degree her functional impairment is.  Her and her family are dealing with the health issues of her  at the moment, so a close follow up with him present in the future would be beneficial to go over CPT results and her diagnosis (progression over time specifically).  There is little need to consider an anti-depressant given her clinical reporting, and I would not consider anti-psychotic medications unless there is progression of agitation/violent behaviors noted.     Plan:  - Donepezil 5 mg at bedtime for 30 days, then    -10 mg at bedtime  - OT for CPT    Follow up in Neurology clinic in 3 months (virtually) or earlier as needed should new concerns arise.    PATRICK Samuel D.O.   of Neurology    Total time today (45 min) in this patient encounter was spent on pre-charting, counseling and/or coordination of care.

## 2023-10-09 NOTE — LETTER
10/9/2023         RE: Terrie Dia  [address]  [place] [address]        Dear Colleague,    Thank you for referring your patient, Terrie Dia, to the [facility]OLOG[facility]. Please see a copy of my visit note below.    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 Please see the comprehensive neurologic consultation notes from those dates in the Epic records for details.     Prior evaluations included:  - OT evaluation 9/24/2021 revealed a MoCA of 21/30 (primary deficits in memory) compared to prior 8/23/2021 score of 19/30.   CPT score was 5.1/5.6.  - Neuropsychology evaluation was performed by Dr. Marylin Batista PhD LP on 10/26/2021 with results suggestive for MCI amnestic type.  - MRI brain 8/26/2021 showed mild non-specific cerebral atrophy, and scattered but numerous white matter changes of the cerebrum (not BG, no cerebellum)    At our last visit, she was to repeat CPT evaluation, neuropsychological evaluation, and continue with Donepezil.    Interval history:   - Neuropsychological testing was done 6/19/2023 with Dr. Samira Todd PhD and showed notable decline over the last 18 months to 2 years with updated diagnosis being that of Major Neurocognitive disorder due to Alzheimer's disease.  There was a noted concern for her driving, and communication with her family, and it was recommended multiple family members join her at upcomming provider appointments to go over her decline and likely increased functional needs.  - No updated CPT was done    Today, the patient is here with her daughter.  They help her with her medication management.  She isn't taking donepezil, perhaps for months.  While her day to day routine has stayed the same, she  has required increased help from her family in the last year.  She has her daughter reviewing bills, medical appointments, and are offering support.  There is little concern for depression in the patient according to her daughter.  She is exercising, working the garden, and doing well socially. She is still living in the same house (53 years).      Physical Exam:   Vitals: BP (!) 173/99 (BP Location: Right arm, Patient Position: Sitting)   Pulse 70    General: Seated comfortably in no acute distress.  Neurologic:     Mental Status: Fully alert, attentive and oriented. Speech clear and fluent, no paraphasic errors.     Cranial Nerves: EOMI with normal smooth pursuit. Facial movements symmetric. Hearing not formally tested but intact to conversation.  No dysarthria.         Assessment and Plan:   Assessment:  Alzheimer's dementia    The patient, her daughter, and I spent most of the visit going over her updated neuropsychology testing and ultimately her transition from MCI amnestic type to Alzheimer's dementia.  At this time, the patient still has capacity for most decision making in her life, but it is still unclear as to what degree her functional impairment is.  Her and her family are dealing with the health issues of her  at the moment, so a close follow up with him present in the future would be beneficial to go over CPT results and her diagnosis (progression over time specifically).  There is little need to consider an anti-depressant given her clinical reporting, and I would not consider anti-psychotic medications unless there is progression of agitation/violent behaviors noted.     Plan:  - Donepezil 5 mg at bedtime for 30 days, then    -10 mg at bedtime  - OT for CPT    Follow up in Neurology clinic in 3 months (virtually) or earlier as needed should new concerns arise.    PATRICK Samuel D.O.   of Neurology    Total time today (45 min) in this patient encounter was spent on  pre-charting, counseling and/or coordination of care.       Again, thank you for allowing me to participate in the care of your patient.        Sincerely,        Cedric Samuel, DO

## 2023-10-09 NOTE — PATIENT INSTRUCTIONS
Your overall diagnosis at this point is that of Alzheimer's dementia.      I would like you to continue/restart a medication:  - Donepezil 5 mg at bedtime for 30 days, then    -10 mg at bedtime    I would like you to continue to have evaluations for your cognitive performance.   - OT for CPT

## 2025-01-15 ENCOUNTER — TELEPHONE (OUTPATIENT)
Dept: NEUROLOGY | Facility: CLINIC | Age: 80
End: 2025-01-15
Payer: COMMERCIAL

## 2025-01-15 NOTE — TELEPHONE ENCOUNTER
[facility] Call Center    Phone Message    May a detailed message be left on voicemail: yes     Reason for Call: Pt's daughter Sherine is requesting a call back from a care team member as she would like to discuss if Pt is needing any cognitive testing done prior to Pt's scheduled appointment for 6/2/25.    Please contact Sherine to advise at [phone]    Action Taken: Message routed to:  Other: [facility]     Travel Screening: Not Applicable

## 2025-01-15 NOTE — TELEPHONE ENCOUNTER
Called was not listed on the consent to communicate form in the chart so I had called the pt to get a verbal consent to speak with the daughter Sherine.    Per pt okayed to speak with the daughter. I advised the pt that at her next visit to update the consent to communicate form.    I called the daughter and left her a detailed message letting her know that per Dr. Samuel's notes from the pt's last visit does not indicate cognitive test orders to be done prior to appointment but to repeat the visit in person due to given limitations during that virtual appt.